# Patient Record
Sex: MALE | Race: WHITE | Employment: FULL TIME | URBAN - METROPOLITAN AREA
[De-identification: names, ages, dates, MRNs, and addresses within clinical notes are randomized per-mention and may not be internally consistent; named-entity substitution may affect disease eponyms.]

---

## 2021-03-08 ENCOUNTER — HOSPITAL ENCOUNTER (EMERGENCY)
Age: 46
Discharge: HOME | End: 2021-03-08
Attending: EMERGENCY MEDICINE
Payer: COMMERCIAL

## 2021-03-08 VITALS
SYSTOLIC BLOOD PRESSURE: 139 MMHG | HEIGHT: 71 IN | TEMPERATURE: 98.1 F | RESPIRATION RATE: 18 BRPM | OXYGEN SATURATION: 98 % | DIASTOLIC BLOOD PRESSURE: 88 MMHG | HEART RATE: 60 BPM | WEIGHT: 190 LBS | BODY MASS INDEX: 26.6 KG/M2

## 2021-03-08 DIAGNOSIS — S56.911A STRAIN OF RIGHT FOREARM, INITIAL ENCOUNTER: Primary | ICD-10-CM

## 2021-03-08 PROCEDURE — 96372 THER/PROPH/DIAG INJ SC/IM: CPT

## 2021-03-08 PROCEDURE — 6360000002 HC RX W HCPCS: Performed by: EMERGENCY MEDICINE

## 2021-03-08 PROCEDURE — 99283 EMERGENCY DEPT VISIT LOW MDM: CPT

## 2021-03-08 RX ORDER — KETOROLAC TROMETHAMINE 10 MG/1
10 TABLET, FILM COATED ORAL EVERY 6 HOURS PRN
Qty: 20 TABLET | Refills: 0 | Status: SHIPPED | OUTPATIENT
Start: 2021-03-08

## 2021-03-08 RX ORDER — KETOROLAC TROMETHAMINE 30 MG/ML
60 INJECTION, SOLUTION INTRAMUSCULAR; INTRAVENOUS ONCE
Status: COMPLETED | OUTPATIENT
Start: 2021-03-08 | End: 2021-03-08

## 2021-03-08 RX ORDER — ORPHENADRINE CITRATE 30 MG/ML
60 INJECTION INTRAMUSCULAR; INTRAVENOUS ONCE
Status: COMPLETED | OUTPATIENT
Start: 2021-03-08 | End: 2021-03-08

## 2021-03-08 RX ORDER — CYCLOBENZAPRINE HCL 10 MG
10 TABLET ORAL 3 TIMES DAILY PRN
Qty: 30 TABLET | Refills: 0 | Status: SHIPPED | OUTPATIENT
Start: 2021-03-08 | End: 2021-03-18

## 2021-03-08 RX ADMIN — ORPHENADRINE CITRATE 60 MG: 30 INJECTION INTRAMUSCULAR; INTRAVENOUS at 01:52

## 2021-03-08 RX ADMIN — KETOROLAC TROMETHAMINE 60 MG: 30 INJECTION, SOLUTION INTRAMUSCULAR at 01:41

## 2021-03-08 ASSESSMENT — PAIN DESCRIPTION - PROGRESSION: CLINICAL_PROGRESSION: GRADUALLY IMPROVING

## 2021-03-08 ASSESSMENT — ENCOUNTER SYMPTOMS
SINUS PRESSURE: 0
NAUSEA: 0
ABDOMINAL DISTENTION: 0
APNEA: 0
EYE PAIN: 0
PHOTOPHOBIA: 0
DIARRHEA: 0
VOMITING: 0
ABDOMINAL PAIN: 0
WHEEZING: 0
BACK PAIN: 0
COUGH: 0
COLOR CHANGE: 0
CONSTIPATION: 0
SHORTNESS OF BREATH: 0
SORE THROAT: 0
RHINORRHEA: 0

## 2021-03-08 ASSESSMENT — PAIN DESCRIPTION - DESCRIPTORS: DESCRIPTORS: SHARP

## 2021-03-08 ASSESSMENT — PAIN SCALES - GENERAL
PAINLEVEL_OUTOF10: 8
PAINLEVEL_OUTOF10: 6

## 2021-03-08 ASSESSMENT — PAIN DESCRIPTION - LOCATION
LOCATION: ARM
LOCATION: ARM

## 2021-03-08 ASSESSMENT — PAIN DESCRIPTION - FREQUENCY: FREQUENCY: CONTINUOUS

## 2021-03-08 ASSESSMENT — PAIN - FUNCTIONAL ASSESSMENT: PAIN_FUNCTIONAL_ASSESSMENT: 0-10

## 2021-03-08 ASSESSMENT — PAIN DESCRIPTION - ORIENTATION
ORIENTATION: RIGHT
ORIENTATION: LOWER

## 2021-03-08 ASSESSMENT — PAIN DESCRIPTION - ONSET: ONSET: ON-GOING

## 2021-03-08 NOTE — ED TRIAGE NOTES
Pt reporting was trying to push a 350lb hog to hold it and his Rt arm started hurting. He is having pain In the Rt forearm.

## 2021-03-08 NOTE — ED PROVIDER NOTES
[address] [place] [address] ED  eMERGENCY dEPARTMENT eNCOUnter      Pt Name: Jac Tate  MRN: [id number]  [place] 1975  Date of evaluation: 3/8/2021  Provider: Denzel Romero MD    CHIEF COMPLAINT       Chief Complaint   Patient presents with    Arm Injury     Injured boy hog while he was holding it          HISTORY OF PRESENT ILLNESS   (Location/Symptom, Timing/Onset,Context/Setting, Quality, Duration, Modifying Factors, Severity)  Note limiting factors. Jac Tate is a 50 y.o. male who presents to the emergency department with complaint of right forearm burn wound holding onto hogs at home earlier in the afternoon. Pain is 6 on a scale 1-10 and worse with use and movement. Denies any other systemic symptoms. Denies any other injuries. Pain does not radiate. Pain was immediate. HPI    Nursing Notes were reviewed. REVIEW OF SYSTEMS    (2-9 systems for level 4, 10 or more for level 5)     Review of Systems   Constitutional: Negative. Negative for activity change, appetite change, chills, fatigue and fever. HENT: Negative for congestion, ear discharge, ear pain, hearing loss, rhinorrhea, sinus pressure and sore throat. Eyes: Negative for photophobia, pain and visual disturbance. Respiratory: Negative for apnea, cough, shortness of breath and wheezing. Cardiovascular: Negative for chest pain, palpitations and leg swelling. Gastrointestinal: Negative for abdominal distention, abdominal pain, constipation, diarrhea, nausea and vomiting. Endocrine: Negative for cold intolerance, heat intolerance and polyuria. Genitourinary: Negative for dysuria, flank pain, frequency and urgency. Musculoskeletal: Positive for arthralgias and myalgias. Negative for back pain, gait problem and neck stiffness. Skin: Negative for color change, pallor and rash. Allergic/Immunologic: Negative for food allergies and immunocompromised state.    Neurological: Negative for dizziness, tremors, syncope, weakness, light-headedness and headaches. Psychiatric/Behavioral: Negative for agitation, confusion and hallucinations. All other systems reviewed and are negative. Except as noted above the remainder of the review of systems was reviewed and negative. PAST MEDICAL HISTORY     Past Medical History:   Diagnosis Date    Angio-edema 10/28/2011    autoimmune. dx'd by Dr Juan Hilton Hypertension     Raynaud phenomenon     Probably primary         SURGICAL HISTORY     History reviewed. No pertinent surgical history. CURRENT MEDICATIONS       Previous Medications    NIFEDIPINE (PROCARDIA-XL) 30 MG (OSM) CR TABLET    Take 1 tablet by mouth daily. ALLERGIES     Pcn [penicillins]    FAMILY HISTORY     History reviewed. No pertinent family history.        SOCIAL HISTORY       Social History     Socioeconomic History    Marital status: Single     Spouse name: None    Number of children: None    Years of education: None    Highest education level: None   Occupational History    None   Social Needs    Financial resource strain: None    Food insecurity     Worry: None     Inability: None    Transportation needs     Medical: None     Non-medical: None   Tobacco Use    Smoking status: Never Smoker    Smokeless tobacco: Current User     Types: Snuff    Tobacco comment: one can week   Substance and Sexual Activity    Alcohol use: Yes     Comment: seldom    Drug use: No    Sexual activity: Yes     Partners: Female   Lifestyle    Physical activity     Days per week: None     Minutes per session: None    Stress: None   Relationships    Social connections     Talks on phone: None     Gets together: None     Attends Jainism service: None     Active member of club or organization: None     Attends meetings of clubs or organizations: None     Relationship status: None    Intimate partner violence     Fear of current or ex partner: None     Emotionally abused: None     Physically abused: None Forced sexual activity: None   Other Topics Concern    None   Social History Narrative    None       SCREENINGS             PHYSICAL EXAM    (up to 7 for level 4, 8 or more for level 5)     ED Triage Vitals [03/08/21 0127]   BP Temp Temp Source Pulse Resp SpO2 Height Weight   (!) 145/87 98 °F (36.7 °C) Oral 56 16 96 % 5' 11\" (1.803 m) 190 lb (86.2 kg)       Physical Exam  Vitals signs and nursing note reviewed. Constitutional:       General: He is not in acute distress. Appearance: Normal appearance. He is well-developed and normal weight. He is not ill-appearing, toxic-appearing or diaphoretic. HENT:      Head: Normocephalic and atraumatic. Nose: Nose normal. No congestion or rhinorrhea. Mouth/Throat:      Mouth: Mucous membranes are moist.      Pharynx: No oropharyngeal exudate or posterior oropharyngeal erythema. Eyes:      General: No scleral icterus. Right eye: No discharge. Left eye: No discharge. Conjunctiva/sclera: Conjunctivae normal.      Pupils: Pupils are equal, round, and reactive to light. Neck:      Musculoskeletal: Normal range of motion and neck supple. No neck rigidity or muscular tenderness. Thyroid: No thyromegaly. Vascular: No carotid bruit or JVD. Trachea: No tracheal deviation. Cardiovascular:      Rate and Rhythm: Normal rate and regular rhythm. Heart sounds: Normal heart sounds. No murmur. No friction rub. No gallop. Pulmonary:      Effort: Pulmonary effort is normal. No respiratory distress. Breath sounds: Normal breath sounds. No stridor. No wheezing, rhonchi or rales. Chest:      Chest wall: No tenderness. Abdominal:      General: Bowel sounds are normal. There is no distension. Palpations: Abdomen is soft. There is no mass. Tenderness: There is no abdominal tenderness. There is no right CVA tenderness, left CVA tenderness, guarding or rebound. Hernia: No hernia is present.    Musculoskeletal: Normal range of motion. General: Swelling, tenderness and signs of injury present. No deformity. Right lower leg: No edema. Left lower leg: No edema. Comments: Right forearm swelling and tenderness. Lymphadenopathy:      Cervical: No cervical adenopathy. Skin:     General: Skin is warm and dry. Capillary Refill: Capillary refill takes less than 2 seconds. Coloration: Skin is not jaundiced or pale. Findings: No bruising, erythema, lesion or rash. Neurological:      General: No focal deficit present. Mental Status: He is alert and oriented to person, place, and time. Mental status is at baseline. Cranial Nerves: No cranial nerve deficit. Sensory: No sensory deficit. Motor: No weakness or abnormal muscle tone. Coordination: Coordination normal.      Gait: Gait normal.      Deep Tendon Reflexes: Reflexes are normal and symmetric. Reflexes normal.   Psychiatric:         Mood and Affect: Mood normal.         Behavior: Behavior normal.         Thought Content: Thought content normal.         Judgment: Judgment normal.         DIAGNOSTIC RESULTS     EKG: All EKG's are interpreted by the Emergency Department Physician who either signs or Co-signs this chart in the absence of a cardiologist.        RADIOLOGY:   Non-plain film images such as CT, Ultrasound and MRI are read by the radiologist. Shonda Mireles radiographicimages are visualized and preliminarily interpreted by the emergency physician with the below findings:        Interpretation per the Radiologist below, if available at the time of this note:    No orders to display         ED BEDSIDE ULTRASOUND:   Performed by ED Physician - none    LABS:  Labs Reviewed - No data to display    All other labs were within normal range or not returned as of this dictation.     EMERGENCY DEPARTMENT COURSE and DIFFERENTIALDIAGNOSIS/MDM:   Vitals:    Vitals:    03/08/21 0127   BP: (!) 145/87   Pulse: 56   Resp: 16   Temp: 98 °F (36.7 °C)   TempSrc: Oral   SpO2: 96%   Weight: 190 lb (86.2 kg)   Height: 5' 11\" (1.803 m)           MDM  Number of Diagnoses or Management Options  Risk of Complications, Morbidity, and/or Mortality  Presenting problems: moderate  Diagnostic procedures: minimal  Management options: moderate        CRITICAL CARE TIME   Total Critical Care time was  minutes, excluding separately reportable procedures. There was a high probability of clinically significant/life threatening deterioration in the patient's condition which required my urgentintervention. CONSULTS:  None    PROCEDURES:  Unless otherwise noted below, none     Procedures    FINAL IMPRESSION      1.  Strain of right forearm, initial encounter          DISPOSITION/PLAN   DISPOSITION Discharge - Pending Orders Complete 03/08/2021 01:41:20 AM      PATIENT REFERRED TO:  Minda Gaucher, MD  [place] [phone]    In 3 days        DISCHARGE MEDICATIONS:  New Prescriptions    CYCLOBENZAPRINE (FLEXERIL) 10 MG TABLET    Take 1 tablet by mouth 3 times daily as needed for Muscle spasms    KETOROLAC (TORADOL) 10 MG TABLET    Take 1 tablet by mouth every 6 hours as needed for Pain          (Please note that portions of this note were completed with a voice recognitionprogram.  Efforts were made to edit the dictations but occasionally words are mis-transcribed.)    Viky Ramires MD (electronically signed)  Attending Emergency Physician          Viky Ramires MD  03/08/21 [id number]       Viky Ramires MD  03/08/21 [id number]

## 2023-11-29 ENCOUNTER — OFFICE VISIT (OUTPATIENT)
Dept: PRIMARY CARE | Facility: CLINIC | Age: 48
End: 2023-11-29
Payer: COMMERCIAL

## 2023-11-29 VITALS
TEMPERATURE: 98 F | BODY MASS INDEX: 28.35 KG/M2 | HEIGHT: 70 IN | SYSTOLIC BLOOD PRESSURE: 123 MMHG | HEART RATE: 61 BPM | DIASTOLIC BLOOD PRESSURE: 80 MMHG | WEIGHT: 198 LBS

## 2023-11-29 DIAGNOSIS — Z13.220 LIPID SCREENING: ICD-10-CM

## 2023-11-29 DIAGNOSIS — Z12.11 COLON CANCER SCREENING: ICD-10-CM

## 2023-11-29 DIAGNOSIS — Z12.5 PROSTATE CANCER SCREENING: ICD-10-CM

## 2023-11-29 DIAGNOSIS — I10 PRIMARY HYPERTENSION: Primary | ICD-10-CM

## 2023-11-29 PROBLEM — M54.50 ACUTE LOW BACK PAIN WITHOUT SCIATICA: Status: ACTIVE | Noted: 2023-11-29

## 2023-11-29 PROCEDURE — 3079F DIAST BP 80-89 MM HG: CPT | Performed by: INTERNAL MEDICINE

## 2023-11-29 PROCEDURE — 3074F SYST BP LT 130 MM HG: CPT | Performed by: INTERNAL MEDICINE

## 2023-11-29 PROCEDURE — 1036F TOBACCO NON-USER: CPT | Performed by: INTERNAL MEDICINE

## 2023-11-29 PROCEDURE — 99213 OFFICE O/P EST LOW 20 MIN: CPT | Performed by: INTERNAL MEDICINE

## 2023-11-29 RX ORDER — NIFEDIPINE 60 MG/1
60 TABLET, EXTENDED RELEASE ORAL DAILY
COMMUNITY
End: 2023-11-29 | Stop reason: SDUPTHER

## 2023-11-29 RX ORDER — NIFEDIPINE 60 MG/1
60 TABLET, EXTENDED RELEASE ORAL DAILY
Qty: 90 TABLET | Refills: 3 | Status: SHIPPED | OUTPATIENT
Start: 2023-11-29 | End: 2024-03-13 | Stop reason: SDUPTHER

## 2023-11-29 ASSESSMENT — ENCOUNTER SYMPTOMS
MUSCULOSKELETAL NEGATIVE: 1
HEADACHES: 0
RESPIRATORY NEGATIVE: 1
CONSTITUTIONAL NEGATIVE: 1
CARDIOVASCULAR NEGATIVE: 1
HYPERTENSION: 1
BLURRED VISION: 0
NEUROLOGICAL NEGATIVE: 1
GASTROINTESTINAL NEGATIVE: 1
PALPITATIONS: 0
PSYCHIATRIC NEGATIVE: 1
EYES NEGATIVE: 1
ORTHOPNEA: 0
SHORTNESS OF BREATH: 0

## 2023-11-29 NOTE — PROGRESS NOTES
"Subjective   Patient ID: Beny Roth II is a 47 y.o. male who presents for [facility] ([facility] care former Dr. Kirk pt).    Hypertension  This is a chronic problem. The current episode started more than 1 year ago. The problem has been waxing and waning since onset. The problem is controlled. Pertinent negatives include no anxiety, blurred vision, chest pain, headaches, malaise/fatigue, orthopnea, palpitations or shortness of breath. Past treatments include calcium channel blockers. The current treatment provides significant improvement. There is no history of angina or kidney disease. There is no history of chronic renal disease.        Review of Systems   Constitutional: Negative.  Negative for malaise/fatigue.   HENT: Negative.     Eyes: Negative.  Negative for blurred vision.   Respiratory: Negative.  Negative for shortness of breath.    Cardiovascular: Negative.  Negative for chest pain, palpitations and orthopnea.   Gastrointestinal: Negative.    Musculoskeletal: Negative.    Skin: Negative.    Neurological: Negative.  Negative for headaches.   Psychiatric/Behavioral: Negative.         Objective   /80 (BP Location: Right arm, Patient Position: Sitting, BP Cuff Size: Adult)   Pulse 61   Temp 36.7 °C (98 °F) (Temporal)   Ht 1.772 m (5' 9.75\")   Wt 89.8 kg (198 lb)   BMI 28.61 kg/m²     Physical Exam  Constitutional:       Appearance: Normal appearance. He is normal weight.   HENT:      Head: Normocephalic.   Eyes:      Conjunctiva/sclera: Conjunctivae normal.   Cardiovascular:      Rate and Rhythm: Normal rate and regular rhythm.      Pulses: Normal pulses.      Heart sounds: Normal heart sounds.   Pulmonary:      Effort: Pulmonary effort is normal.      Breath sounds: Normal breath sounds.   Abdominal:      General: Abdomen is flat.      Palpations: Abdomen is soft.   Musculoskeletal:         General: Normal range of motion.      Cervical back: Neck supple.   Skin:     General: " Skin is warm and dry.   Neurological:      General: No focal deficit present.      Mental Status: He is oriented to person, place, and time. Mental status is at baseline.   Psychiatric:         Mood and Affect: Mood normal.         Judgment: Judgment normal.       Assessment/Plan   Problem List Items Addressed This Visit             ICD-10-CM    Hypertension - Primary I10   Patients BP readings reviewed and addressed, as we age our arteries turn stiffer and less elastic. Restricting salt consumption and staying physically fit with regular exercise regimen is the only way to keep our vasculature less tonic. Studies have shown that keeping ideal body wt, exercise routine about 140 to 150 minutes a week, eating variety of plant based diet and drinking plentiful water are quite helpful. Monitor BP twice or once a week at home and bring log to be reviewed by me. Uncontrolled BP has long term consequences including heart failure, myocardial infarction, accelerated atherosclerosis and kidney dysfunction. Therapy reviewed and explained.   47-year-old male patient came here as a new patient, he works full-time, he is , someone found that he was having Raynaud's-like symptoms somewhat prescribed nifedipine but he was hypertensive so dosage was increased to 60 mg and actually he is normotensive, previous to physicians evaluations were reviewed he has not have any laboratories done for more than 2 years.  He will require colonoscopy at the age of 47, he will require PSA screening and several other laboratories as a part of checkup, he remains largely asymptomatic, he is a little bit heavyset but I will make patient, he remains quite physically active and does not have any family issues or any mental health issues, will do the laboratories and nifedipine will be continued as listed without any change in the dosage with follow-up in 6 months.

## 2023-12-14 ENCOUNTER — TELEPHONE (OUTPATIENT)
Dept: GASTROENTEROLOGY | Facility: CLINIC | Age: 48
End: 2023-12-14
Payer: COMMERCIAL

## 2024-03-13 DIAGNOSIS — I10 PRIMARY HYPERTENSION: ICD-10-CM

## 2024-03-13 RX ORDER — NIFEDIPINE 60 MG/1
60 TABLET, EXTENDED RELEASE ORAL DAILY
Qty: 90 TABLET | Refills: 1 | Status: SHIPPED | OUTPATIENT
Start: 2024-03-13

## 2024-05-30 ENCOUNTER — OFFICE VISIT (OUTPATIENT)
Dept: PRIMARY CARE | Facility: CLINIC | Age: 49
End: 2024-05-30
Payer: COMMERCIAL

## 2024-05-30 ENCOUNTER — LAB (OUTPATIENT)
Dept: LAB | Facility: LAB | Age: 49
End: 2024-05-30
Payer: COMMERCIAL

## 2024-05-30 VITALS
WEIGHT: 201 LBS | SYSTOLIC BLOOD PRESSURE: 120 MMHG | BODY MASS INDEX: 28.77 KG/M2 | HEART RATE: 60 BPM | DIASTOLIC BLOOD PRESSURE: 80 MMHG | TEMPERATURE: 96.4 F | HEIGHT: 70 IN

## 2024-05-30 DIAGNOSIS — I10 PRIMARY HYPERTENSION: Primary | ICD-10-CM

## 2024-05-30 DIAGNOSIS — Z12.5 PROSTATE CANCER SCREENING: ICD-10-CM

## 2024-05-30 DIAGNOSIS — Z13.220 LIPID SCREENING: ICD-10-CM

## 2024-05-30 DIAGNOSIS — I10 PRIMARY HYPERTENSION: ICD-10-CM

## 2024-05-30 DIAGNOSIS — Z12.11 COLON CANCER SCREENING: ICD-10-CM

## 2024-05-30 LAB
ALBUMIN SERPL BCP-MCNC: 4.3 G/DL (ref 3.4–5)
ALP SERPL-CCNC: 71 U/L (ref 33–120)
ALT SERPL W P-5'-P-CCNC: 23 U/L (ref 10–52)
ANION GAP SERPL CALC-SCNC: 10 MMOL/L (ref 10–20)
AST SERPL W P-5'-P-CCNC: 16 U/L (ref 9–39)
BASOPHILS # BLD AUTO: 0.07 X10*3/UL (ref 0–0.1)
BASOPHILS NFR BLD AUTO: 0.9 %
BILIRUB SERPL-MCNC: 0.6 MG/DL (ref 0–1.2)
BUN SERPL-MCNC: 18 MG/DL (ref 6–23)
CALCIUM SERPL-MCNC: 9.3 MG/DL (ref 8.6–10.3)
CHLORIDE SERPL-SCNC: 104 MMOL/L (ref 98–107)
CHOLEST SERPL-MCNC: 184 MG/DL (ref 0–199)
CHOLESTEROL/HDL RATIO: 3.4
CO2 SERPL-SCNC: 30 MMOL/L (ref 21–32)
CREAT SERPL-MCNC: 0.92 MG/DL (ref 0.5–1.3)
EGFRCR SERPLBLD CKD-EPI 2021: >90 ML/MIN/1.73M*2
EOSINOPHIL # BLD AUTO: 0.62 X10*3/UL (ref 0–0.7)
EOSINOPHIL NFR BLD AUTO: 8.1 %
ERYTHROCYTE [DISTWIDTH] IN BLOOD BY AUTOMATED COUNT: 13.2 % (ref 11.5–14.5)
GLUCOSE SERPL-MCNC: 70 MG/DL (ref 74–99)
HCT VFR BLD AUTO: 44.5 % (ref 41–52)
HDLC SERPL-MCNC: 54.7 MG/DL
HGB BLD-MCNC: 15.1 G/DL (ref 13.5–17.5)
IMM GRANULOCYTES # BLD AUTO: 0.03 X10*3/UL (ref 0–0.7)
IMM GRANULOCYTES NFR BLD AUTO: 0.4 % (ref 0–0.9)
LDLC SERPL CALC-MCNC: 94 MG/DL
LYMPHOCYTES # BLD AUTO: 1.29 X10*3/UL (ref 1.2–4.8)
LYMPHOCYTES NFR BLD AUTO: 16.8 %
MCH RBC QN AUTO: 29.7 PG (ref 26–34)
MCHC RBC AUTO-ENTMCNC: 33.9 G/DL (ref 32–36)
MCV RBC AUTO: 87 FL (ref 80–100)
MONOCYTES # BLD AUTO: 0.71 X10*3/UL (ref 0.1–1)
MONOCYTES NFR BLD AUTO: 9.2 %
NEUTROPHILS # BLD AUTO: 4.96 X10*3/UL (ref 1.2–7.7)
NEUTROPHILS NFR BLD AUTO: 64.6 %
NON HDL CHOLESTEROL: 129 MG/DL (ref 0–149)
NRBC BLD-RTO: 0 /100 WBCS (ref 0–0)
PLATELET # BLD AUTO: 185 X10*3/UL (ref 150–450)
POTASSIUM SERPL-SCNC: 4.1 MMOL/L (ref 3.5–5.3)
PROT SERPL-MCNC: 6.5 G/DL (ref 6.4–8.2)
PSA SERPL-MCNC: 1.62 NG/ML
RBC # BLD AUTO: 5.09 X10*6/UL (ref 4.5–5.9)
SODIUM SERPL-SCNC: 140 MMOL/L (ref 136–145)
TRIGL SERPL-MCNC: 178 MG/DL (ref 0–149)
VLDL: 36 MG/DL (ref 0–40)
WBC # BLD AUTO: 7.7 X10*3/UL (ref 4.4–11.3)

## 2024-05-30 PROCEDURE — 3074F SYST BP LT 130 MM HG: CPT | Performed by: INTERNAL MEDICINE

## 2024-05-30 PROCEDURE — 80053 COMPREHEN METABOLIC PANEL: CPT

## 2024-05-30 PROCEDURE — 1036F TOBACCO NON-USER: CPT | Performed by: INTERNAL MEDICINE

## 2024-05-30 PROCEDURE — 99213 OFFICE O/P EST LOW 20 MIN: CPT | Performed by: INTERNAL MEDICINE

## 2024-05-30 PROCEDURE — 85025 COMPLETE CBC W/AUTO DIFF WBC: CPT

## 2024-05-30 PROCEDURE — 80061 LIPID PANEL: CPT

## 2024-05-30 PROCEDURE — 3079F DIAST BP 80-89 MM HG: CPT | Performed by: INTERNAL MEDICINE

## 2024-05-30 PROCEDURE — 84153 ASSAY OF PSA TOTAL: CPT

## 2024-05-30 PROCEDURE — 36415 COLL VENOUS BLD VENIPUNCTURE: CPT

## 2024-05-30 ASSESSMENT — ENCOUNTER SYMPTOMS
MUSCULOSKELETAL NEGATIVE: 1
CARDIOVASCULAR NEGATIVE: 1
CONSTITUTIONAL NEGATIVE: 1
SHORTNESS OF BREATH: 0
DIZZINESS: 0
HEADACHES: 0
RESPIRATORY NEGATIVE: 1
GASTROINTESTINAL NEGATIVE: 1
HYPERTENSION: 1
PALPITATIONS: 0

## 2024-05-30 NOTE — PROGRESS NOTES
"Subjective   Patient ID: Beny Roth II is a 48 y.o. male who presents for Follow-up (6 mo fu).    Hypertension  This is a chronic problem. The current episode started more than 1 year ago. The problem has been resolved since onset. The problem is controlled. Pertinent negatives include no anxiety, chest pain, headaches, palpitations, peripheral edema or shortness of breath. Risk factors for coronary artery disease include male gender. Past treatments include calcium channel blockers. The current treatment provides significant improvement. There is no history of angina or kidney disease. There is no history of chronic renal disease.        Review of Systems   Constitutional: Negative.    Respiratory: Negative.  Negative for shortness of breath.    Cardiovascular: Negative.  Negative for chest pain and palpitations.   Gastrointestinal: Negative.    Musculoskeletal: Negative.    Neurological:  Negative for dizziness and headaches.       Objective   /80 (BP Location: Right arm, Patient Position: Sitting, BP Cuff Size: Adult)   Pulse 60   Temp 35.8 °C (96.4 °F) (Temporal)   Ht 1.772 m (5' 9.75\")   Wt 91.2 kg (201 lb)   BMI 29.05 kg/m²     Physical Exam  Vitals reviewed.   Constitutional:       Appearance: Normal appearance. He is overweight.   HENT:      Head: Normocephalic and atraumatic.   Eyes:      Conjunctiva/sclera: Conjunctivae normal.   Cardiovascular:      Rate and Rhythm: Normal rate and regular rhythm.      Pulses: Normal pulses.   Pulmonary:      Effort: Pulmonary effort is normal.      Breath sounds: Normal breath sounds.   Abdominal:      Palpations: Abdomen is soft.   Musculoskeletal:         General: Normal range of motion.      Cervical back: Neck supple.   Skin:     General: Skin is warm and dry.   Neurological:      General: No focal deficit present.         Assessment/Plan   Problem List Items Addressed This Visit             ICD-10-CM    Hypertension - Primary I10   Patient was " evaluated today, problem list was reviewed, problems and concerns addressed, Rx list reviewed and updated, lab and tests were noted and reviewed. Life style changes were discussed, always it works better if we eat plant based diet and plenty of fibres and roughage. Consume adequate amount of water and avoid alcohol, light to moderate physical activities and stress reduction are always beneficial for ongoing physical well being. Do not forget to have 6 to 7 hours of sleep regularly.  He does need his labs today it will be followed, he remains on nifedipine, he works at the [employer].  No events or concerns, he sleeps well, considering the age of 48 we will require colonoscopy which was actually offered last time but somehow it was not done, another referral was given, he will require coronary calcium score also considering his risk profile.  Lets wait for lipid profile, no new medications were given, follow-up in 6 months.

## 2024-06-04 ENCOUNTER — TELEPHONE (OUTPATIENT)
Dept: GASTROENTEROLOGY | Facility: CLINIC | Age: 49
End: 2024-06-04
Payer: COMMERCIAL

## 2024-08-28 RX ORDER — SODIUM CHLORIDE, SODIUM LACTATE, POTASSIUM CHLORIDE, CALCIUM CHLORIDE 600; 310; 30; 20 MG/100ML; MG/100ML; MG/100ML; MG/100ML
20 INJECTION, SOLUTION INTRAVENOUS CONTINUOUS
OUTPATIENT
Start: 2024-08-28

## 2024-09-06 ENCOUNTER — APPOINTMENT (OUTPATIENT)
Dept: GASTROENTEROLOGY | Facility: HOSPITAL | Age: 49
End: 2024-09-06
Payer: COMMERCIAL

## 2024-09-23 ENCOUNTER — TELEPHONE (OUTPATIENT)
Dept: SURGERY | Facility: CLINIC | Age: 49
End: 2024-09-23
Payer: COMMERCIAL

## 2024-09-23 NOTE — PREPROCEDURE INSTRUCTIONS

## 2024-09-30 ENCOUNTER — HOSPITAL ENCOUNTER (OUTPATIENT)
Dept: GASTROENTEROLOGY | Facility: HOSPITAL | Age: 49
Discharge: HOME | End: 2024-09-30
Payer: COMMERCIAL

## 2024-09-30 ENCOUNTER — ANESTHESIA EVENT (OUTPATIENT)
Dept: GASTROENTEROLOGY | Facility: HOSPITAL | Age: 49
End: 2024-09-30
Payer: COMMERCIAL

## 2024-09-30 ENCOUNTER — ANESTHESIA (OUTPATIENT)
Dept: GASTROENTEROLOGY | Facility: HOSPITAL | Age: 49
End: 2024-09-30
Payer: COMMERCIAL

## 2024-09-30 VITALS
TEMPERATURE: 97.2 F | HEIGHT: 70 IN | OXYGEN SATURATION: 98 % | DIASTOLIC BLOOD PRESSURE: 74 MMHG | WEIGHT: 186.51 LBS | BODY MASS INDEX: 26.7 KG/M2 | HEART RATE: 67 BPM | RESPIRATION RATE: 18 BRPM | SYSTOLIC BLOOD PRESSURE: 118 MMHG

## 2024-09-30 DIAGNOSIS — Z12.11 COLON CANCER SCREENING: ICD-10-CM

## 2024-09-30 PROCEDURE — 2500000004 HC RX 250 GENERAL PHARMACY W/ HCPCS (ALT 636 FOR OP/ED)

## 2024-09-30 PROCEDURE — 3700000001 HC GENERAL ANESTHESIA TIME - INITIAL BASE CHARGE

## 2024-09-30 PROCEDURE — 7100000010 HC PHASE TWO TIME - EACH INCREMENTAL 1 MINUTE

## 2024-09-30 PROCEDURE — 7100000009 HC PHASE TWO TIME - INITIAL BASE CHARGE

## 2024-09-30 PROCEDURE — 2500000005 HC RX 250 GENERAL PHARMACY W/O HCPCS

## 2024-09-30 PROCEDURE — 3700000002 HC GENERAL ANESTHESIA TIME - EACH INCREMENTAL 1 MINUTE

## 2024-09-30 PROCEDURE — 2500000004 HC RX 250 GENERAL PHARMACY W/ HCPCS (ALT 636 FOR OP/ED): Performed by: STUDENT IN AN ORGANIZED HEALTH CARE EDUCATION/TRAINING PROGRAM

## 2024-09-30 PROCEDURE — 45378 DIAGNOSTIC COLONOSCOPY: CPT | Performed by: SURGERY

## 2024-09-30 RX ORDER — GLYCOPYRROLATE 0.2 MG/ML
INJECTION INTRAMUSCULAR; INTRAVENOUS AS NEEDED
Status: DISCONTINUED | OUTPATIENT
Start: 2024-09-30 | End: 2024-09-30

## 2024-09-30 RX ORDER — LIDOCAINE HYDROCHLORIDE 20 MG/ML
INJECTION, SOLUTION INFILTRATION; PERINEURAL AS NEEDED
Status: DISCONTINUED | OUTPATIENT
Start: 2024-09-30 | End: 2024-09-30

## 2024-09-30 RX ORDER — PROPOFOL 10 MG/ML
INJECTION, EMULSION INTRAVENOUS AS NEEDED
Status: DISCONTINUED | OUTPATIENT
Start: 2024-09-30 | End: 2024-09-30

## 2024-09-30 RX ORDER — SODIUM CHLORIDE, SODIUM LACTATE, POTASSIUM CHLORIDE, CALCIUM CHLORIDE 600; 310; 30; 20 MG/100ML; MG/100ML; MG/100ML; MG/100ML
20 INJECTION, SOLUTION INTRAVENOUS CONTINUOUS
Status: DISCONTINUED | OUTPATIENT
Start: 2024-09-30 | End: 2024-10-01 | Stop reason: HOSPADM

## 2024-09-30 SDOH — HEALTH STABILITY: MENTAL HEALTH: CURRENT SMOKER: 0

## 2024-09-30 ASSESSMENT — ENCOUNTER SYMPTOMS
CONSTITUTIONAL NEGATIVE: 1
MUSCULOSKELETAL NEGATIVE: 1
PSYCHIATRIC NEGATIVE: 1
CARDIOVASCULAR NEGATIVE: 1
GASTROINTESTINAL NEGATIVE: 1
EYES NEGATIVE: 1
ENDOCRINE NEGATIVE: 1
HEMATOLOGIC/LYMPHATIC NEGATIVE: 1
ALLERGIC/IMMUNOLOGIC NEGATIVE: 1
RESPIRATORY NEGATIVE: 1
NEUROLOGICAL NEGATIVE: 1

## 2024-09-30 ASSESSMENT — COLUMBIA-SUICIDE SEVERITY RATING SCALE - C-SSRS
1. IN THE PAST MONTH, HAVE YOU WISHED YOU WERE DEAD OR WISHED YOU COULD GO TO SLEEP AND NOT WAKE UP?: NO
2. HAVE YOU ACTUALLY HAD ANY THOUGHTS OF KILLING YOURSELF?: NO
6. HAVE YOU EVER DONE ANYTHING, STARTED TO DO ANYTHING, OR PREPARED TO DO ANYTHING TO END YOUR LIFE?: NO

## 2024-09-30 ASSESSMENT — PAIN SCALES - GENERAL
PAINLEVEL_OUTOF10: 0 - NO PAIN

## 2024-09-30 ASSESSMENT — PAIN - FUNCTIONAL ASSESSMENT
PAIN_FUNCTIONAL_ASSESSMENT: 0-10

## 2024-09-30 NOTE — Clinical Note
Patient tolerated procedure well. Appears comfortable with no complaints of pain. VS stable. Arousable prior to transport. Patient transported to [facility] via cart.  Report called      per CRNA to [facility]        . Handoff completed

## 2024-09-30 NOTE — ANESTHESIA POSTPROCEDURE EVALUATION
Patient: Beny Roth II    Procedure Summary       Date: 09/30/24 Room / Location: [facility]    Anesthesia Start: 1038 Anesthesia Stop:     Procedure: COLONOSCOPY Diagnosis: Colon cancer screening    Scheduled Providers: Odalys Carter MD; Torrey Wolfe MD; Sanchez Breen RN; Jeanne Gray Responsible Provider: Torrey Wolfe MD    Anesthesia Type: MAC ASA Status: 2            Anesthesia Type: MAC    Vitals Value Taken Time   /77 09/30/24 1105   Temp - 09/30/24 1105   Pulse 65 09/30/24 1105   Resp 18 09/30/24 1105   SpO2 96 09/30/24 1105       Anesthesia Post Evaluation    Patient location during evaluation: PACU  Patient participation: complete - patient participated  Level of consciousness: awake  Pain management: adequate  Multimodal analgesia pain management approach  Airway patency: patent  Cardiovascular status: acceptable  Respiratory status: acceptable  Hydration status: acceptable  Postoperative Nausea and Vomiting: none        No notable events documented.

## 2024-09-30 NOTE — H&P
History Of Present Illness  Beny Roth II is a 48 y.o. male presenting with screening.     Past Medical History  Past Medical History:   Diagnosis Date    COVID-19     VACCINATED    Hypertension     Wears contact lenses     Wears glasses        Surgical History  No past surgical history on file.     Social History  He reports that he has never smoked. He has quit using smokeless tobacco.  His smokeless tobacco use included chew. He reports that he does not currently use alcohol. He reports that he does not use drugs.    Family History  Family History   Problem Relation Name Age of Onset    Hypertension Father      Heart disease Maternal Grandmother      Other (abdominal aortic aneurysm) Paternal Grandfather          Allergies  Penicillins    Review of Systems   Constitutional: Negative.    HENT: Negative.     Eyes: Negative.    Respiratory: Negative.     Cardiovascular: Negative.    Gastrointestinal: Negative.    Endocrine: Negative.    Genitourinary: Negative.    Musculoskeletal: Negative.    Skin: Negative.    Allergic/Immunologic: Negative.    Neurological: Negative.    Hematological: Negative.    Psychiatric/Behavioral: Negative.          Physical Exam  Constitutional:       General: He is not in acute distress.     Appearance: Normal appearance. He is normal weight. He is not toxic-appearing or diaphoretic.   HENT:      Head: Normocephalic and atraumatic.   Eyes:      Conjunctiva/sclera: Conjunctivae normal.      Pupils: Pupils are equal, round, and reactive to light.   Cardiovascular:      Rate and Rhythm: Normal rate and regular rhythm.   Pulmonary:      Effort: Pulmonary effort is normal. No respiratory distress.   Abdominal:      General: Abdomen is flat. There is no distension.      Palpations: Abdomen is soft. There is no mass.      Tenderness: There is no abdominal tenderness.      Hernia: No hernia is present.   Musculoskeletal:         General: No swelling.   Skin:     General: Skin is warm and  dry.   Neurological:      General: No focal deficit present.      Mental Status: He is alert. Mental status is at baseline.   Psychiatric:         Mood and Affect: Mood normal.         Behavior: Behavior normal.         Thought Content: Thought content normal.         Judgment: Judgment normal.          Last Recorded Vitals  There were no vitals taken for this visit.    Relevant Results             Assessment/Plan   Assessment & Plan  Colon cancer screening      colonoscopy       I spent  minutes in the professional and overall care of this patient.      Odalys Carter MD

## 2024-09-30 NOTE — Clinical Note
Huddle and Timeout completed together with team. Patient wristband and CELIA information verified.  Anesthesia safety check completed

## 2024-09-30 NOTE — DISCHARGE INSTRUCTIONS
Patient Instructions after a Colonoscopy      The anesthetics, sedatives or narcotics which were given to you today will be acting in your body for the next 24 hours, so you might feel a little sleepy or groggy.  This feeling should slowly wear off. Carefully read and follow the instructions.     You received sedation today:  - Do not drive or operate any machinery or power tools of any kind.   - No alcoholic beverages today, not even beer or wine.  - Do not make any important decisions or sign any legal documents.  - No over the counter medications that contain alcohol or that may cause drowsiness.  - Do not make any important decisions or sign any legal documents.    While it is common to experience mild to moderate abdominal distention, gas, or belching after your procedure, if any of these symptoms occur following discharge from the GI Lab or within one week of having your procedure, call the [facility] to be advised whether a visit to your nearest Urgent Care or Emergency Department is indicated.  Take this paper with you if you go.     - If you develop an allergic reaction to the medications that were given during your procedure such as difficulty breathing, rash, hives, severe nausea, vomiting or lightheadedness.  - If you experience chest pain, shortness of breath, severe abdominal pain, fevers and chills.  -If you develop signs and symptoms of bleeding such as blood in your spit, if your stools turn black, tarry, or bloody  - If you have not urinated within 8 hours following your procedure.  - If your IV site becomes painful, red, inflamed, or looks infected.    If you received a biopsy/polypectomy/sphincterotomy the following instructions apply below:    __ Do not use Aspirin containing products, non-steroidal medications or anti-coagulants for one week following your procedure. (Examples of these types of medications are: Advil, Arthrotec, Aleve, Coumadin, Ecotrin, Heparin, Ibuprofen,  Indocin, Motrin, Naprosyn, Nuprin, Plavix, Vioxx, and Voltarin, or their generic forms.  This list is not all-inclusive.  Check with your physician or pharmacist before resuming medications.)   __ Eat a soft diet today.  Avoid foods that are poorly digested for the next 24 hours.  These foods would include: nuts, beans, lettuce, red meats, and fried foods. Start with liquids and advance your diet as tolerated, gradually work up to eating solids.   __ Do not have a Barium Study or Enema for one week.    Your physician recommends the additional following instructions:    -You have a contact number available for emergencies. The signs and symptoms of potential delayed complications were discussed with you. You may return to normal activities tomorrow.  -Resume your previous diet.  -Continue your present medications.   -We are waiting for your pathology results.  -Your physician has recommended a repeat colonoscopy (date to be determined after pending pathology results are reviewed) for surveillance based on pathology results.  -The findings and recommendations have been discussed with you.  -The findings and recommendations were discussed with your family.  - Please see Medication Reconciliation Form for new medication/medications prescribed.       If you experience any problems or have any questions following discharge from the GI Lab, please call:  Before 5p.m.  [phone]  After 5p.m.    [phone]    Nurse Signature                                                                        Date___________________                                                                            Patient/Responsible Party Signature                                        Date___________________High Fiber Diet    About this topic  Dietary fiber helps many illnesses. It can help you if you cannot have a bowel movement or if you have loose stools. Fiber can also lower your risk of diabetes and heart disease. Fiber can help  with weight loss by helping you feel smith after meals.  You can find fiber in fruits, vegetables, nuts and seeds, whole grains, and legumes. The fiber is the part of the plant food that your body cannot break down and absorb. It passes through your stomach, small bowel, colon, and out your body.  There are two kinds of fiber: Insoluble and soluble fiber. Insoluble fiber helps you pass foods through your digestive system. Insoluble fiber can help you with hard stools. Soluble fiber draws water in and turns it into a gel-like form making digestion slow down. Both are important.    What will the results be?  A high fiber diet can help you with bowel problems like stools that are too hard or too loose. It can also help prevent hemorrhoids and other colon problems. A high fiber diet can also help control your weight and lower blood sugar and cholesterol levels.  What changes to diet are needed?  The amount of fiber you need is based on your age, gender, and health.  Try to get 20 to 35 grams of fiber in your diet each day. Most people in the [place] only eat 15 grams of fiber daily.  Drink at least 8 cups (1920 mL) of fluid each day.  When is this diet used?  Your doctor may talk with you about this diet if you have belly problems.  Who should use this diet?  Older children, young people, and adults can have this diet.  Who should not use this diet?  Some people should not use this diet. Check with your doctor if you have:  Diverticulitis  Active Crohn's disease  Ulcerative colitis  Bowel inflammation  Certain types of GI surgery  Talk to your child's doctor before starting your child on a high fiber diet.  What foods are good to eat?  To get the most from fiber in your diet, eat a wide variety of high fiber foods. Some examples are:  Vegetables like:  Spinach  Peas  Artichoke  Sweet potatoes with skin  Broccoli  Fruits like:  Raspberries  Blueberries  Blackberries  Apples with skin  Dried fruits  Grains like:  Oat  bran  Barley  Whole wheat products  Wheat bran  Dried beans and nuts like:  Sunflower seeds  Almonds  Black beans  Chickpeas  What problems could happen?  Sudden increase of fiber intake can lead to gas, pain, fullness in your belly, and loose stools. Increase fiber gradually while drinking plenty of fluids.  Do not eat too much fiber. Your body will not take in vitamins and minerals as well if you eat too much fiber.  When do I need to call the doctor?  Health problem is not better or you are feeling worse.  Helpful tips  Start slow as you add more fiber to your diet. This may help prevent gas or cramps.  Try to eat the same amount of fiber each day. Aim to get your fiber from nutritious foods. Supplements do not offer the same benefits as food.  Read food labels with care to learn how much fiber is in the food you are eating.  If possible, do not peel fruits or vegetables before you eat them. Eating the peel gives you more fiber.  Where can I learn more?  Eat Right  https://www.eatright.org/food/vitamins-and-supplements/types-of-vitamins-and-nutrients/easy-ways-to-boost-fiber-in-your-daily-diet  Last Reviewed Date  2021-09-23

## 2024-09-30 NOTE — ANESTHESIA PREPROCEDURE EVALUATION
Beny Roth II is a 48 y.o. male here for:    Colonoscopy  With Odalys Carter MD  Colon cancer screening    Relevant Problems   Cardiac   (+) Hypertension       Lab Results   Component Value Date    HGB 15.1 05/30/2024    HCT 44.5 05/30/2024    WBC 7.7 05/30/2024     05/30/2024     05/30/2024    K 4.1 05/30/2024     05/30/2024    CREATININE 0.92 05/30/2024    BUN 18 05/30/2024       Social History     Tobacco Use   Smoking Status Never   Smokeless Tobacco Former    Types: Chew       Allergies   Allergen Reactions    Penicillins Rash       Current Outpatient Medications   Medication Instructions    NIFEdipine ER (ADALAT CC) 60 mg, oral, Daily       No past surgical history on file.    Family History   Problem Relation Name Age of Onset    Hypertension Father      Heart disease Maternal Grandmother      Other (abdominal aortic aneurysm) Paternal Grandfather         NPO Details:  No data recorded    Physical Exam    Airway  Mallampati: II  TM distance: >3 FB     Cardiovascular    Dental    Pulmonary    Abdominal            Anesthesia Plan    History of general anesthesia?: no  History of complications of general anesthesia?: no    ASA 2     MAC     The patient is not a current smoker.    intravenous induction   Anesthetic plan and risks discussed with patient.    Plan discussed with CRNA.

## 2024-10-02 NOTE — PROGRESS NOTES
"Beny Roth II is a 48 y.o. male on day 0 of admission presenting with No Principal Problem: There is no principal problem currently on the Problem List. Please update the Problem List and refresh..    Subjective   I did not do colonoscopy on this patient so I cannot comment on polyp removal or how many polyps were removed I am not the gastroenterologist.       Objective     Physical Exam    Last Recorded Vitals  Blood pressure 118/74, pulse 67, temperature 36.2 °C (97.2 °F), temperature source Temporal, resp. rate 18, height 1.778 m (5' 10\"), weight 84.6 kg (186 lb 8.2 oz), SpO2 98%.  Intake/Output last 3 Shifts:  No intake/output data recorded.    Relevant Results                              Assessment/Plan   Assessment & Plan  Colon cancer screening    I did not do colonoscopy.       I spent I am responding to the query.  Minutes in the professional and overall care of this patient.      Justo Patricia MD      "

## 2024-10-05 LAB
LABORATORY COMMENT REPORT: NORMAL
PATH REPORT.FINAL DX SPEC: NORMAL
PATH REPORT.GROSS SPEC: NORMAL
PATH REPORT.RELEVANT HX SPEC: NORMAL
PATH REPORT.TOTAL CANCER: NORMAL

## 2024-10-22 DIAGNOSIS — I10 PRIMARY HYPERTENSION: ICD-10-CM

## 2024-10-22 RX ORDER — NIFEDIPINE 60 MG/1
60 TABLET, EXTENDED RELEASE ORAL DAILY
Qty: 90 TABLET | Refills: 1 | Status: SHIPPED | OUTPATIENT
Start: 2024-10-22

## 2024-10-30 ENCOUNTER — HOSPITAL ENCOUNTER (OUTPATIENT)
Dept: RADIOLOGY | Facility: HOSPITAL | Age: 49
Discharge: HOME | End: 2024-10-30
Payer: COMMERCIAL

## 2024-10-30 DIAGNOSIS — I10 PRIMARY HYPERTENSION: ICD-10-CM

## 2024-10-30 PROCEDURE — 75571 CT HRT W/O DYE W/CA TEST: CPT

## 2024-11-05 ENCOUNTER — OFFICE VISIT (OUTPATIENT)
Dept: URGENT CARE | Age: 49
End: 2024-11-05
Payer: COMMERCIAL

## 2024-11-05 VITALS
BODY MASS INDEX: 27.72 KG/M2 | HEIGHT: 71 IN | HEART RATE: 57 BPM | OXYGEN SATURATION: 92 % | TEMPERATURE: 97.7 F | DIASTOLIC BLOOD PRESSURE: 76 MMHG | SYSTOLIC BLOOD PRESSURE: 130 MMHG | WEIGHT: 198 LBS | RESPIRATION RATE: 18 BRPM

## 2024-11-05 DIAGNOSIS — S81.811A LEG LACERATION, RIGHT, INITIAL ENCOUNTER: Primary | ICD-10-CM

## 2024-11-05 PROCEDURE — 3078F DIAST BP <80 MM HG: CPT | Performed by: PHYSICIAN ASSISTANT

## 2024-11-05 PROCEDURE — 99203 OFFICE O/P NEW LOW 30 MIN: CPT | Performed by: PHYSICIAN ASSISTANT

## 2024-11-05 PROCEDURE — 3075F SYST BP GE 130 - 139MM HG: CPT | Performed by: PHYSICIAN ASSISTANT

## 2024-11-05 PROCEDURE — 3008F BODY MASS INDEX DOCD: CPT | Performed by: PHYSICIAN ASSISTANT

## 2024-11-05 PROCEDURE — 12002 RPR S/N/AX/GEN/TRNK2.6-7.5CM: CPT | Performed by: PHYSICIAN ASSISTANT

## 2024-11-05 PROCEDURE — 1036F TOBACCO NON-USER: CPT | Performed by: PHYSICIAN ASSISTANT

## 2024-11-05 NOTE — PATIENT INSTRUCTIONS
Keep wound covered for 24 hours and remove dressing.  Afterwards, wash daily with soap and water but do not submerge for 1 week.  Keep clean, dry, and open to air unless engaging in activity that might compromise the repair.  Do not apply topical ointments and do not use harsh soaps for cleansing.  Suture removal in 7 to 10 days.

## 2024-11-05 NOTE — PROGRESS NOTES
"Subjective   Patient ID: Beny Roth II is a 48 y.o. male who presents for Laceration (Cut on inside of medial rt knee in patella. Injury happened on 11/5/24.).    Laceration    Presents for evaluation and treatment of   laceration.  The pt accidently lacerated the anterior right thigh just above the patella just pta.  Pt was able to apply pressure and affect hemostasis.  No other lacerations as a result.  Pt does not take anti-platelet/coagulant medicine.  No change in ROM or gross sensation.  Pt is current with tetanus prophylaxis.  No other complaints        Review of Systems    Constitutional:  See HPI   Integumentary: See HPI  Neurologic:  Alert and oriented X4, No numbness, No tingling.    All other systems are negative     Objective     /76   Pulse 57   Temp 36.5 °C (97.7 °F) (Oral)   Resp 18   Ht 1.803 m (5' 11\")   Wt 89.8 kg (198 lb)   SpO2 92%   BMI 27.62 kg/m²     Physical Exam    General:  Alert and oriented, No acute distress.    Eye:  Pupils are equal, round and reactive to light, Normal conjunctiva.    HENT:  Normocephalic,   Neck:  Supple    Respiratory: Respirations are non-labored   Musculoskeletal: Normal ROM and strength  Integumentary: Anterior right thigh with a 5 cm full-thickness laceration; deep tissue was exposed but not involved; wound is clean and without debris  Neurologic:  Alert, Oriented, Normal sensory, Cranial Nerves II-XII are grossly intact  Psychiatric:  Cooperative, Appropriate mood & affect.    Laceration Repair    Date/Time: 11/5/2024 3:16 PM    Performed by: Kin Rosenthal PA-C  Authorized by: Kin Rosenthal PA-C    Consent:     Consent obtained:  Verbal    Consent given by:  Patient    Risks discussed:  Infection  Anesthesia:     Anesthesia method:  Local infiltration    Local anesthetic:  Lidocaine 2% WITH epi  Laceration details:     Location:  Leg    Leg location:  R upper leg    Length (cm):  5  Pre-procedure details:     Preparation:  Patient was " prepped and draped in usual sterile fashion  Treatment:     Area cleansed with:  Chlorhexidine    Amount of cleaning:  Standard  Skin repair:     Repair method:  Sutures    Suture size:  3-0    Suture material:  Nylon    Number of sutures:  5  Repair type:     Repair type:  Simple  Post-procedure details:     Dressing:  Antibiotic ointment and sterile dressing    Procedure completion:  Tolerated well, no immediate complications        Assessment/Plan   Exam revealed right leg laceration.  Good results with procedure.  Wound care reviewed.  Suture removal in 7 to 10 days.  Patient's clinical presentation is otherwise unremarkable at this time. Patient is discharged with instructions to follow-up with primary care or seek emergency medical attention for worsening symptoms or any new concerns.  Problem List Items Addressed This Visit    None  Visit Diagnoses       Leg laceration, right, initial encounter    -  Primary            Final diagnoses:   [L52.674W] Leg laceration, right, initial encounter

## 2024-11-05 NOTE — LETTER
November 5, 2024     Patient: Beny Roth II   YOB: 1975   Date of Visit: 11/5/2024       To Whom It May Concern:    Beny Roth was seen in my clinic on 11/5/2024 at 2:15 pm. Please excuse Beny for his absence from work on this day to make the appointment.    If you have any questions or concerns, please don't hesitate to call.         Sincerely,         Kin Rosenthal PA-C        CC: No Recipients

## 2024-11-26 ENCOUNTER — APPOINTMENT (OUTPATIENT)
Dept: PRIMARY CARE | Facility: CLINIC | Age: 49
End: 2024-11-26
Payer: COMMERCIAL

## 2025-02-12 ENCOUNTER — APPOINTMENT (OUTPATIENT)
Dept: PRIMARY CARE | Facility: CLINIC | Age: 50
End: 2025-02-12
Payer: COMMERCIAL

## 2025-02-12 VITALS
WEIGHT: 197 LBS | HEIGHT: 70 IN | SYSTOLIC BLOOD PRESSURE: 134 MMHG | DIASTOLIC BLOOD PRESSURE: 78 MMHG | BODY MASS INDEX: 28.2 KG/M2 | HEART RATE: 54 BPM | TEMPERATURE: 96.9 F

## 2025-02-12 DIAGNOSIS — Z12.5 PROSTATE CANCER SCREENING: Primary | ICD-10-CM

## 2025-02-12 DIAGNOSIS — R00.1 BRADYCARDIA: ICD-10-CM

## 2025-02-12 DIAGNOSIS — I10 PRIMARY HYPERTENSION: ICD-10-CM

## 2025-02-12 DIAGNOSIS — Z13.220 LIPID SCREENING: ICD-10-CM

## 2025-02-12 PROCEDURE — 1036F TOBACCO NON-USER: CPT | Performed by: INTERNAL MEDICINE

## 2025-02-12 PROCEDURE — 93000 ELECTROCARDIOGRAM COMPLETE: CPT | Performed by: INTERNAL MEDICINE

## 2025-02-12 PROCEDURE — 99213 OFFICE O/P EST LOW 20 MIN: CPT | Performed by: INTERNAL MEDICINE

## 2025-02-12 PROCEDURE — 3078F DIAST BP <80 MM HG: CPT | Performed by: INTERNAL MEDICINE

## 2025-02-12 PROCEDURE — 3075F SYST BP GE 130 - 139MM HG: CPT | Performed by: INTERNAL MEDICINE

## 2025-02-12 PROCEDURE — 3008F BODY MASS INDEX DOCD: CPT | Performed by: INTERNAL MEDICINE

## 2025-02-12 RX ORDER — NIFEDIPINE 60 MG/1
60 TABLET, EXTENDED RELEASE ORAL DAILY
Qty: 90 TABLET | Refills: 1 | Status: SHIPPED | OUTPATIENT
Start: 2025-02-12

## 2025-02-12 ASSESSMENT — PATIENT HEALTH QUESTIONNAIRE - PHQ9
SUM OF ALL RESPONSES TO PHQ9 QUESTIONS 1 AND 2: 0
2. FEELING DOWN, DEPRESSED OR HOPELESS: NOT AT ALL
1. LITTLE INTEREST OR PLEASURE IN DOING THINGS: NOT AT ALL

## 2025-02-12 ASSESSMENT — ENCOUNTER SYMPTOMS
NEUROLOGICAL NEGATIVE: 1
RESPIRATORY NEGATIVE: 1
LOSS OF SENSATION IN FEET: 0
CONSTITUTIONAL NEGATIVE: 1
CARDIOVASCULAR NEGATIVE: 1
DEPRESSION: 0
MUSCULOSKELETAL NEGATIVE: 1
OCCASIONAL FEELINGS OF UNSTEADINESS: 0
GASTROINTESTINAL NEGATIVE: 1

## 2025-02-12 ASSESSMENT — COLUMBIA-SUICIDE SEVERITY RATING SCALE - C-SSRS
1. IN THE PAST MONTH, HAVE YOU WISHED YOU WERE DEAD OR WISHED YOU COULD GO TO SLEEP AND NOT WAKE UP?: NO
6. HAVE YOU EVER DONE ANYTHING, STARTED TO DO ANYTHING, OR PREPARED TO DO ANYTHING TO END YOUR LIFE?: NO
2. HAVE YOU ACTUALLY HAD ANY THOUGHTS OF KILLING YOURSELF?: NO

## 2025-02-12 NOTE — PROGRESS NOTES
Progress Note    Patient: Lesly Zhang MRN: [id number]  SSN: [id number]    YOB: 1943  Age: 66 y.o. Sex: male      Admit Date: 4/5/2018    LOS: 6 days     Subjective:     More obtunded today did have a additional cardiopulmonary arrest last night. Underlying etiology of cognitive status remains unclear. Unable to get MRI yesterday in terms of his overall stability. Objective: There is a mild withdrawal response to deep painful stimulation  Vitals:    04/11/18 0738 04/11/18 0756 04/11/18 0822 04/11/18 0952   BP:       Pulse: 89 77 77 81   Resp: 18 22 22 21   Temp:       SpO2: 99% 100% 100% 99%   Weight:       Height:            Intake and Output:  Current Shift: 04/11 0701 - 04/11 1900  In: -   Out: 44   Last three shifts: 04/09 1901 - 04/11 0700  In: 4533.2 [I.V.:2669.2]  Out: 4956 [Urine:4800]    Physical Exam:   Unresponsive. With propping his eyes open there are some random eye movements. Corneals are present bilaterally pupils are equal and reactive to light. Lab/Data Review:  Recent Results (from the past 24 hour(s))   AMMONIA    Collection Time: 04/10/18 12:45 PM   Result Value Ref Range    Ammonia <10 (L) 11 - 32 UMOL/L   BLOOD GAS, ARTERIAL    Collection Time: 04/11/18  3:50 AM   Result Value Ref Range    pH 7.14 (L) 7.35 - 7.45      PCO2 56 (H) 35 - 45 mmHg    PO2 176 (H) 80 - 105 mmHg    BICARBONATE 19 (L) 22 - 26 mmol/L    BASE DEFICIT 10.7 (H) 0 - 2 mmol/L    SITE RB     ALLENS TEST NA     MODE PRVC     FIO2 100.0 %    Tidal volume 400.0      RATE 15.0      PEEP/CPAP 5.0      Respiratory comment: Dr Carter Axon at 4 11 2018 4 10 02 AM. Read back.     METABOLIC PANEL, BASIC    Collection Time: 04/11/18  4:49 AM   Result Value Ref Range    Sodium 147 (H) 136 - 145 mmol/L    Potassium 4.3 3.5 - 5.1 mmol/L    Chloride 109 (H) 98 - 107 mmol/L    CO2 26 21 - 32 mmol/L    Anion gap 12 7 - 16 mmol/L    Glucose 307 (H) 65 - 100 mg/dL    BUN 77 (H) 8 - 23 MG/DL    Creatinine Subjective   Patient ID: Beny Roth II is a 49 y.o. male who presents for Follow-up (6 mo fu).  HPI  Hypertension  This is a chronic problem. The current episode started more than 1 year ago. The problem has been resolved since onset. The problem is controlled. Pertinent negatives include no anxiety, chest pain, headaches, palpitations, peripheral edema or shortness of breath. Risk factors for coronary artery disease include male gender. Past treatments include calcium channel blockers. The current treatment provides significant improvement. There is no history of angina or kidney disease. There is no history of chronic renal disease.     Past Medical History  Past Medical History:   Diagnosis Date    Hypertension        Social History  Social History     Tobacco Use    Smoking status: Never    Smokeless tobacco: Former     Types: Chew   Vaping Use    Vaping status: Never Used   Substance Use Topics    Alcohol use: Not Currently     Comment: RARELY    Drug use: Never       Family History     Family History   Problem Relation Name Age of Onset    Hypertension Father      Heart disease Maternal Grandmother      Other (abdominal aortic aneurysm) Paternal Grandfather         Allergies:  Allergies   Allergen Reactions    Penicillins Rash        Outpatient Medications:  Current Outpatient Medications   Medication Instructions    NIFEdipine ER (ADALAT CC) 60 mg, oral, Daily        Review of Systems   Constitutional: Negative.    HENT: Negative.     Respiratory: Negative.     Cardiovascular: Negative.    Gastrointestinal: Negative.    Musculoskeletal: Negative.    Neurological: Negative.          Objective       Physical Exam  Vitals reviewed.   Constitutional:       Appearance: Normal appearance. He is normal weight.   HENT:      Head: Normocephalic and atraumatic.   Eyes:      Conjunctiva/sclera: Conjunctivae normal.   Cardiovascular:      Rate and Rhythm: Regular rhythm. Bradycardia present.      Pulses: Normal  "pulses.   Pulmonary:      Effort: Pulmonary effort is normal.      Breath sounds: Normal breath sounds.   Abdominal:      Palpations: Abdomen is soft.   Musculoskeletal:         General: Normal range of motion.      Cervical back: Neck supple.   Skin:     General: Skin is warm and dry.   Neurological:      General: No focal deficit present.   Psychiatric:         Mood and Affect: Mood normal.     /78 (BP Location: Right arm, Patient Position: Sitting, BP Cuff Size: Adult)   Pulse 54   Temp 36.1 °C (96.9 °F) (Temporal)   Ht 1.765 m (5' 9.5\")   Wt 89.4 kg (197 lb)   BMI 28.67 kg/m²      Assessment/Plan   Problem List Items Addressed This Visit       Hypertension    Relevant Medications    NIFEdipine XL 60 mg 24 hr tablet   He came for regular checkup, he feels well, he is on nifedipine, during exam I noticed that he is bradycardic, heart rate was 56 but when I did EKG it was 47, I do not see any previous EKG, he is not aware about being told that his heart rate is low, on my previous encounter I did not have a low heart beats, we need a 7-day heart monitor, we need to see the training that what is happening on a daily basis with his heart rate, nifedipine is appropriate treatment, at least he is not on any beta-blockers.  Last times laboratories were unremarkable, we will add a TSH on the next times labs, colonoscopy was reviewed, coronary calcium score scan was reviewed.  Screen for thyroid disorder, depends upon the Holter monitor appropriate interventions will be taken, he will continue to do his normal activities, follow-up in 6 months.  " 2.34 (H) 0.8 - 1.5 MG/DL    GFR est AA 35 (L) >60 ml/min/1.73m2    GFR est non-AA 29 (L) >60 ml/min/1.73m2    Calcium 8.0 (L) 8.3 - 10.4 MG/DL   CBC W/O DIFF    Collection Time: 04/11/18  4:49 AM   Result Value Ref Range    WBC 13.2 (H) 4.3 - 11.1 K/uL    RBC 2.69 (L) 4.23 - 5.67 M/uL    HGB 8.5 (L) 13.6 - 17.2 g/dL    HCT 26.4 (L) 41.1 - 50.3 %    MCV 98.1 (H) 79.6 - 97.8 FL    MCH 31.6 26.1 - 32.9 PG    MCHC 32.2 31.4 - 35.0 g/dL    RDW 14.0 11.9 - 14.6 %    PLATELET 443 (L) 052 - 450 K/uL    MPV 11.5 10.8 - 14.1 FL   MAGNESIUM    Collection Time: 04/11/18  4:49 AM   Result Value Ref Range    Magnesium 2.2 1.8 - 2.4 mg/dL   PHOSPHORUS    Collection Time: 04/11/18  4:49 AM   Result Value Ref Range    Phosphorus 6.7 (H) 2.3 - 3.7 MG/DL   TROPONIN I    Collection Time: 04/11/18  4:49 AM   Result Value Ref Range    Troponin-I, Qt. 3.40 (HH) 0.02 - 0.05 NG/ML   BLOOD GAS, ARTERIAL    Collection Time: 04/11/18  6:30 AM   Result Value Ref Range    pH 7.29 (L) 7.35 - 7.45      PCO2 52 (H) 35 - 45 mmHg    PO2 64 (L) 80 - 105 mmHg    BICARBONATE 24 22 - 26 mmol/L    BASE DEFICIT 3.2 (H) 0 - 2 mmol/L    SITE RB     ALLENS TEST NA     MODE PRVC     FIO2 80.0 %    Tidal volume 500.0      PEEP/CPAP 5.0      Respiratory comment: Marjorie HAYWARD at 4 11 2018 6 47 00 AM. Read back. 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 Principal Problem:    Pneumonia of right lower lobe due to infectious organism ([facility] (4/5/2018)    Active Problems:    Acute respiratory failure with hypoxia ([facility] (4/6/2018)      Encephalopathy (4/6/2018)      Pleural effusion (4/6/2018)      Cardiac arrest ([address] (4/6/2018)      Bacteremia (4/7/2018)      Volume overload (4/9/2018)      Pneumothorax on left (4/11/2018)      Hypotension (4/11/2018)        Plan:     I would continue with support. No evidence of seizure in terms of underlying etiology  Possibility of associated meningeal encephalitis with pneumonitis. Unlikely.   The patient is not immunocompromised which is the most common scenario although the undersigned has recently seen a case associated with high positive influenza A titers    In terms of brainstem reflexes there has been no specific deterioration since yesterday despite the arrest    Signed By: Radu Monteiro MD     April 11, 2018

## 2025-02-13 ENCOUNTER — TELEPHONE (OUTPATIENT)
Dept: CARDIOLOGY | Facility: CLINIC | Age: 50
End: 2025-02-13
Payer: COMMERCIAL

## 2025-02-13 NOTE — TELEPHONE ENCOUNTER
7 day [employer] [id number] does not require prior auth per the [id number].

## 2025-03-03 ENCOUNTER — APPOINTMENT (OUTPATIENT)
Dept: CARDIOLOGY | Facility: CLINIC | Age: 50
End: 2025-03-03
Payer: COMMERCIAL

## 2025-03-03 DIAGNOSIS — R00.1 BRADYCARDIA: ICD-10-CM

## 2025-03-13 PROCEDURE — 93248 EXT ECG>7D<15D REV&INTERPJ: CPT | Performed by: INTERNAL MEDICINE

## 2025-04-03 ENCOUNTER — HOSPITAL ENCOUNTER (EMERGENCY)
Facility: HOSPITAL | Age: 50
Discharge: HOME | End: 2025-04-04
Attending: STUDENT IN AN ORGANIZED HEALTH CARE EDUCATION/TRAINING PROGRAM
Payer: COMMERCIAL

## 2025-04-03 ENCOUNTER — APPOINTMENT (OUTPATIENT)
Dept: RADIOLOGY | Facility: HOSPITAL | Age: 50
End: 2025-04-03
Payer: COMMERCIAL

## 2025-04-03 DIAGNOSIS — S70.11XA HEMATOMA OF RIGHT THIGH, INITIAL ENCOUNTER: Primary | ICD-10-CM

## 2025-04-03 LAB
ABO GROUP (TYPE) IN BLOOD: NORMAL
ANTIBODY SCREEN: NORMAL
APTT PPP: 29 SECONDS (ref 26–36)
BASOPHILS # BLD AUTO: 0.04 X10*3/UL (ref 0–0.1)
BASOPHILS NFR BLD AUTO: 0.3 %
EOSINOPHIL # BLD AUTO: 0.15 X10*3/UL (ref 0–0.7)
EOSINOPHIL NFR BLD AUTO: 1.1 %
ERYTHROCYTE [DISTWIDTH] IN BLOOD BY AUTOMATED COUNT: 12.7 % (ref 11.5–14.5)
HCT VFR BLD AUTO: 40.6 % (ref 41–52)
HGB BLD-MCNC: 14.1 G/DL (ref 13.5–17.5)
IMM GRANULOCYTES # BLD AUTO: 0.07 X10*3/UL (ref 0–0.7)
IMM GRANULOCYTES NFR BLD AUTO: 0.5 % (ref 0–0.9)
INR PPP: 1.1 (ref 0.9–1.1)
LYMPHOCYTES # BLD AUTO: 0.8 X10*3/UL (ref 1.2–4.8)
LYMPHOCYTES NFR BLD AUTO: 5.8 %
MCH RBC QN AUTO: 29.5 PG (ref 26–34)
MCHC RBC AUTO-ENTMCNC: 34.7 G/DL (ref 32–36)
MCV RBC AUTO: 85 FL (ref 80–100)
MONOCYTES # BLD AUTO: 0.9 X10*3/UL (ref 0.1–1)
MONOCYTES NFR BLD AUTO: 6.5 %
NEUTROPHILS # BLD AUTO: 11.94 X10*3/UL (ref 1.2–7.7)
NEUTROPHILS NFR BLD AUTO: 85.8 %
NRBC BLD-RTO: 0 /100 WBCS (ref 0–0)
PLATELET # BLD AUTO: 194 X10*3/UL (ref 150–450)
PROTHROMBIN TIME: 12 SECONDS (ref 9.8–12.4)
RBC # BLD AUTO: 4.78 X10*6/UL (ref 4.5–5.9)
RH FACTOR (ANTIGEN D): NORMAL
WBC # BLD AUTO: 13.9 X10*3/UL (ref 4.4–11.3)

## 2025-04-03 PROCEDURE — 73552 X-RAY EXAM OF FEMUR 2/>: CPT | Mod: RT

## 2025-04-03 PROCEDURE — 85025 COMPLETE CBC W/AUTO DIFF WBC: CPT | Performed by: PHYSICIAN ASSISTANT

## 2025-04-03 PROCEDURE — 80053 COMPREHEN METABOLIC PANEL: CPT | Performed by: PHYSICIAN ASSISTANT

## 2025-04-03 PROCEDURE — 2500000001 HC RX 250 WO HCPCS SELF ADMINISTERED DRUGS (ALT 637 FOR MEDICARE OP): Performed by: STUDENT IN AN ORGANIZED HEALTH CARE EDUCATION/TRAINING PROGRAM

## 2025-04-03 PROCEDURE — 85610 PROTHROMBIN TIME: CPT | Performed by: STUDENT IN AN ORGANIZED HEALTH CARE EDUCATION/TRAINING PROGRAM

## 2025-04-03 PROCEDURE — 86900 BLOOD TYPING SEROLOGIC ABO: CPT | Performed by: STUDENT IN AN ORGANIZED HEALTH CARE EDUCATION/TRAINING PROGRAM

## 2025-04-03 PROCEDURE — 86850 RBC ANTIBODY SCREEN: CPT | Performed by: STUDENT IN AN ORGANIZED HEALTH CARE EDUCATION/TRAINING PROGRAM

## 2025-04-03 PROCEDURE — 85730 THROMBOPLASTIN TIME PARTIAL: CPT | Performed by: STUDENT IN AN ORGANIZED HEALTH CARE EDUCATION/TRAINING PROGRAM

## 2025-04-03 PROCEDURE — 36415 COLL VENOUS BLD VENIPUNCTURE: CPT | Performed by: STUDENT IN AN ORGANIZED HEALTH CARE EDUCATION/TRAINING PROGRAM

## 2025-04-03 PROCEDURE — 73552 X-RAY EXAM OF FEMUR 2/>: CPT | Mod: RIGHT SIDE | Performed by: SURGERY

## 2025-04-03 PROCEDURE — 99285 EMERGENCY DEPT VISIT HI MDM: CPT | Performed by: STUDENT IN AN ORGANIZED HEALTH CARE EDUCATION/TRAINING PROGRAM

## 2025-04-03 RX ORDER — MORPHINE SULFATE 4 MG/ML
4 INJECTION, SOLUTION INTRAMUSCULAR; INTRAVENOUS ONCE
Status: DISCONTINUED | OUTPATIENT
Start: 2025-04-03 | End: 2025-04-03

## 2025-04-03 RX ORDER — ACETAMINOPHEN 325 MG/1
650 TABLET ORAL ONCE
Status: COMPLETED | OUTPATIENT
Start: 2025-04-03 | End: 2025-04-03

## 2025-04-03 RX ORDER — ONDANSETRON HYDROCHLORIDE 2 MG/ML
4 INJECTION, SOLUTION INTRAVENOUS ONCE
Status: DISCONTINUED | OUTPATIENT
Start: 2025-04-03 | End: 2025-04-04 | Stop reason: HOSPADM

## 2025-04-03 RX ORDER — IBUPROFEN 400 MG/1
400 TABLET ORAL ONCE
Status: COMPLETED | OUTPATIENT
Start: 2025-04-03 | End: 2025-04-03

## 2025-04-03 RX ADMIN — ACETAMINOPHEN 650 MG: 325 TABLET ORAL at 23:01

## 2025-04-03 RX ADMIN — IBUPROFEN 400 MG: 400 TABLET, FILM COATED ORAL at 23:01

## 2025-04-03 ASSESSMENT — LIFESTYLE VARIABLES
HAVE PEOPLE ANNOYED YOU BY CRITICIZING YOUR DRINKING: NO
EVER FELT BAD OR GUILTY ABOUT YOUR DRINKING: NO
EVER HAD A DRINK FIRST THING IN THE MORNING TO STEADY YOUR NERVES TO GET RID OF A HANGOVER: NO
TOTAL SCORE: 0
HAVE YOU EVER FELT YOU SHOULD CUT DOWN ON YOUR DRINKING: NO

## 2025-04-03 ASSESSMENT — PAIN DESCRIPTION - ORIENTATION: ORIENTATION: RIGHT

## 2025-04-03 ASSESSMENT — PAIN - FUNCTIONAL ASSESSMENT: PAIN_FUNCTIONAL_ASSESSMENT: 0-10

## 2025-04-03 ASSESSMENT — PAIN DESCRIPTION - LOCATION: LOCATION: LEG

## 2025-04-03 ASSESSMENT — PAIN SCALES - GENERAL: PAINLEVEL_OUTOF10: 8

## 2025-04-03 NOTE — Clinical Note
Beny Roth was seen and treated in our emergency department on 4/3/2025.  He may return to work on 04/07/2025.       If you have any questions or concerns, please don't hesitate to call.      Kirby Rush PA-C

## 2025-04-04 ENCOUNTER — APPOINTMENT (OUTPATIENT)
Dept: RADIOLOGY | Facility: HOSPITAL | Age: 50
End: 2025-04-04
Payer: COMMERCIAL

## 2025-04-04 VITALS
WEIGHT: 197 LBS | HEART RATE: 50 BPM | RESPIRATION RATE: 16 BRPM | HEIGHT: 70 IN | OXYGEN SATURATION: 98 % | TEMPERATURE: 99 F | BODY MASS INDEX: 28.2 KG/M2 | SYSTOLIC BLOOD PRESSURE: 138 MMHG | DIASTOLIC BLOOD PRESSURE: 74 MMHG

## 2025-04-04 LAB
ALBUMIN SERPL BCP-MCNC: 4.2 G/DL (ref 3.4–5)
ALP SERPL-CCNC: 77 U/L (ref 33–120)
ALT SERPL W P-5'-P-CCNC: 23 U/L (ref 10–52)
ANION GAP SERPL CALC-SCNC: 11 MMOL/L (ref 10–20)
AST SERPL W P-5'-P-CCNC: 18 U/L (ref 9–39)
BILIRUB SERPL-MCNC: 0.9 MG/DL (ref 0–1.2)
BUN SERPL-MCNC: 19 MG/DL (ref 6–23)
CALCIUM SERPL-MCNC: 8.9 MG/DL (ref 8.6–10.3)
CHLORIDE SERPL-SCNC: 104 MMOL/L (ref 98–107)
CO2 SERPL-SCNC: 27 MMOL/L (ref 21–32)
CREAT SERPL-MCNC: 0.86 MG/DL (ref 0.5–1.3)
EGFRCR SERPLBLD CKD-EPI 2021: >90 ML/MIN/1.73M*2
GLUCOSE SERPL-MCNC: 100 MG/DL (ref 74–99)
POTASSIUM SERPL-SCNC: 3.9 MMOL/L (ref 3.5–5.3)
PROT SERPL-MCNC: 6.6 G/DL (ref 6.4–8.2)
SODIUM SERPL-SCNC: 138 MMOL/L (ref 136–145)

## 2025-04-04 PROCEDURE — 2550000001 HC RX 255 CONTRASTS: Performed by: STUDENT IN AN ORGANIZED HEALTH CARE EDUCATION/TRAINING PROGRAM

## 2025-04-04 PROCEDURE — 73706 CT ANGIO LWR EXTR W/O&W/DYE: CPT | Mod: RT

## 2025-04-04 PROCEDURE — 73706 CT ANGIO LWR EXTR W/O&W/DYE: CPT | Mod: RIGHT SIDE | Performed by: SURGERY

## 2025-04-04 RX ORDER — TRAMADOL HYDROCHLORIDE 50 MG/1
50 TABLET ORAL EVERY 6 HOURS PRN
Qty: 15 TABLET | Refills: 0 | Status: SHIPPED | OUTPATIENT
Start: 2025-04-04 | End: 2025-04-08

## 2025-04-04 RX ORDER — KETOROLAC TROMETHAMINE 10 MG/1
10 TABLET, FILM COATED ORAL EVERY 6 HOURS PRN
Qty: 20 TABLET | Refills: 0 | Status: SHIPPED | OUTPATIENT
Start: 2025-04-04 | End: 2025-04-09

## 2025-04-04 RX ADMIN — IOHEXOL 100 ML: 350 INJECTION, SOLUTION INTRAVENOUS at 00:58

## 2025-04-04 ASSESSMENT — PAIN SCALES - GENERAL: PAINLEVEL_OUTOF10: 6

## 2025-04-04 NOTE — PROGRESS NOTES
Emergency Medicine Transition of Care Note    I received Beny Roth II in signout from Parul Merritt.  Please see the previous ED provider note for all HPI, PE and MDM up to the time of signout at 0 100. This is in addition to the primary record.    In brief Beny Roth II is an 49 y.o. male presenting for   Chief Complaint   Patient presents with    Leg Pain     At the time of signout we were awaiting: CT study, reevaluation disposition    Diagnoses as of 04/04/25 0223   Hematoma of right thigh, initial encounter       Medical Decision Making  Patient's laboratory studies within normal limits other than a mild white count 13.9.  CT angio shows hematoma but no active extravasation.  Patient also has a small radiodense foreign body.  In discussing with this with the patient patient's foreign body is from a prior injury.  There is no active bleeding.  Will discharge patient home.  Will have patient's thigh wrapped with Ace apply ice.  We discussed reasons to return to the emergency department.  Patient agrees with this plan expressed verbal understanding.  Questions were answered.    Started the patient    Diagnosis: Right thigh hematoma        Final diagnoses:   [S70.11XA] Hematoma of right thigh, initial encounter     Labs Reviewed   CBC WITH AUTO DIFFERENTIAL - Abnormal       Result Value    WBC 13.9 (*)     nRBC 0.0      RBC 4.78      Hemoglobin 14.1      Hematocrit 40.6 (*)     MCV 85      MCH 29.5      MCHC 34.7      RDW 12.7      Platelets 194      Neutrophils % 85.8      Immature Granulocytes %, Automated 0.5      Lymphocytes % 5.8      Monocytes % 6.5      Eosinophils % 1.1      Basophils % 0.3      Neutrophils Absolute 11.94 (*)     Immature Granulocytes Absolute, Automated 0.07      Lymphocytes Absolute 0.80 (*)     Monocytes Absolute 0.90      Eosinophils Absolute 0.15      Basophils Absolute 0.04     COMPREHENSIVE METABOLIC PANEL - Abnormal    Glucose 100 (*)     Sodium 138      Potassium  3.9      Chloride 104      Bicarbonate 27      Anion Gap 11      Urea Nitrogen 19      Creatinine 0.86      eGFR >90      Calcium 8.9      Albumin 4.2      Alkaline Phosphatase 77      Total Protein 6.6      AST 18      Bilirubin, Total 0.9      ALT 23     PROTIME-INR - Normal    Protime 12.0      INR 1.1     APTT - Normal    aPTT 29      Narrative:     The APTT is no longer used for monitoring Unfractionated Heparin Therapy. For monitoring Heparin Therapy, use the Heparin Assay.   TYPE AND SCREEN    ABO TYPE O      Rh TYPE POS      ANTIBODY SCREEN NEG          CT angio lower extremity right w and or wo IV contrast   Final Result   There is an ill-defined region of the mixed attenuation compatible   with hematoma in the ventral right thigh musculature, the precise   margins of which are difficult to delineate, but which measures up to   at least 11.5 x 6.5 cm in transaxial diameters and 12.5 cm in CC   diameter. There is surrounding edema.        There is a small radiodense foreign body in the subcutaneous fat of   the ventral-medial right thigh, without surrounding edema, most   likely related to remote prior penetrating trauma.        Right lower extremity arteries appear normal. There is no abnormal   enhancement to suggest active bleeding.             MACRO:   None        Signed by: Arun Saunders 4/4/2025 1:32 AM   Dictation workstation:   [id number]      XR femur right 2+ views   Final Result   Please see above.             MACRO:   None        Signed by: Arun Saunders 4/3/2025 10:50 PM   Dictation workstation:   [id number]              Procedure  Procedures    Kirby Rush PA-C

## 2025-04-04 NOTE — ED PROVIDER NOTES
HPI   Chief Complaint   Patient presents with    Leg Pain       49-year-old male presents to the emergency department for right upper leg injury.  Patient states this morning, he was walking pushing a wheelbarrow and tripped and fell forward.  He states he struck his right thigh on something but is unsure what it was.  States he had an instant pain but he stood up, walked in the house and took ibuprofen and initially felt okay.  He had full range of motion of his leg initially.  However, as the day has gone on, his thigh has become increasingly swollen and he states he no longer can bend his knee or bear weight on his leg.  The pain has becomes severe.  He is not anticoagulated.  He denies temperature change in his right leg.  He denies any other injuries.            Patient History   Past Medical History:   Diagnosis Date    Hypertension      No past surgical history on file.  Family History   Problem Relation Name Age of Onset    Hypertension Father      Heart disease Maternal Grandmother      Other (abdominal aortic aneurysm) Paternal Grandfather       Social History     Tobacco Use    Smoking status: Never    Smokeless tobacco: Former     Types: Chew   Vaping Use    Vaping status: Never Used   Substance Use Topics    Alcohol use: Not Currently     Comment: RARELY    Drug use: Never       Physical Exam   ED Triage Vitals [04/03/25 2157]   Temperature Heart Rate Respirations BP   37.2 °C (99 °F) 64 18 --      Pulse Ox Temp Source Heart Rate Source Patient Position   97 % Temporal Monitor --      BP Location FiO2 (%)     Right arm --       Physical Exam  Vitals and nursing note reviewed.   Constitutional:       General: He is not in acute distress.  HENT:      Head: Atraumatic.      Mouth/Throat:      Mouth: Mucous membranes are moist.      Pharynx: Oropharynx is clear.   Eyes:      Extraocular Movements: Extraocular movements intact.      Conjunctiva/sclera: Conjunctivae normal.      Pupils: Pupils are equal,  round, and reactive to light.   Cardiovascular:      Rate and Rhythm: Normal rate and regular rhythm.      Pulses: Normal pulses.   Pulmonary:      Effort: Pulmonary effort is normal. No respiratory distress.      Breath sounds: Normal breath sounds.   Abdominal:      General: There is no distension.      Palpations: Abdomen is soft.      Tenderness: There is no abdominal tenderness. There is no guarding or rebound.   Musculoskeletal:         General: No deformity.      Cervical back: Neck supple.      Comments: Tense, wood-like right anterior/lateral thigh with exquisite tenderness to palpation.  Patient is unable to flex and extend the knee.  He is unable to lift his leg at the thigh.  He has 2+ pedal pulses and capillary refill around 2 seconds.  Sensation intact.  No pallor.   Skin:     General: Skin is warm and dry.   Neurological:      Mental Status: He is alert and oriented to person, place, and time. Mental status is at baseline.      Cranial Nerves: No cranial nerve deficit.      Sensory: No sensory deficit.      Motor: No weakness.   Psychiatric:         Mood and Affect: Mood normal.         Behavior: Behavior normal.         ED Course & MDM                  No data recorded     [place] Coma Scale Score: 15 (04/03/25 2201 : Gela Dickson RN)                           Medical Decision Making  49-year-old male presents to the emergency department for right thigh injury this morning which has progressed to severe pain, swelling and loss of movement.  On my exam, he has a tense, extremely tender, swollen right upper leg in the anterior lateral thigh.  He does not have pulselessness, pallor, paresthesias but he does have pain out of proportion.  I do have a suspicion for compartment syndrome, although he may just have a large hematoma.  CT angio ordered  X-ray of the right femur shows a foreign body which is chronic for the patient.  Patient signed out to Kirby Rush pending CT angio results and  disposition.        Procedure  Procedures     Parul Merritt PA-C  04/04/25 0045

## 2025-04-14 ENCOUNTER — OFFICE VISIT (OUTPATIENT)
Dept: ORTHOPEDIC SURGERY | Facility: CLINIC | Age: 50
End: 2025-04-14
Payer: COMMERCIAL

## 2025-04-14 DIAGNOSIS — S80.11XA CONTUSION OF RIGHT LEG, INITIAL ENCOUNTER: ICD-10-CM

## 2025-04-14 DIAGNOSIS — T14.8XXA HEMATOMA AND CONTUSION: ICD-10-CM

## 2025-04-14 PROCEDURE — 99203 OFFICE O/P NEW LOW 30 MIN: CPT | Performed by: INTERNAL MEDICINE

## 2025-04-14 PROCEDURE — 99213 OFFICE O/P EST LOW 20 MIN: CPT | Performed by: INTERNAL MEDICINE

## 2025-04-14 ASSESSMENT — PATIENT HEALTH QUESTIONNAIRE - PHQ9
2. FEELING DOWN, DEPRESSED OR HOPELESS: NOT AT ALL
1. LITTLE INTEREST OR PLEASURE IN DOING THINGS: NOT AT ALL
SUM OF ALL RESPONSES TO PHQ9 QUESTIONS 1 AND 2: 0

## 2025-04-14 NOTE — PROGRESS NOTES
"    History: Beny \"Elio\" is here for his right thigh.    Past medical history: Multiple  Medications: Multiple  Allergies: No known drug allergies    Please refer to the intake H&P regarding the patient's review of systems, family history and social history as was done today    HEENT: Normal  Lungs: Clear to auscultation  Heart: RRR  Abdomen: Soft, nontender  Skin: clear  Extremity: []   Contralateral exam is normal for strength, motion, stability and neurovascular assessment.    Radiographs: []     Assessment: []     Plan: []   All questions were answered today with the patient.    Scribe Attestation  By signing my name below, IAni Scribe   attest that this documentation has been prepared under the direction and in the presence of Oscar Floyd MD.    "

## 2025-04-14 NOTE — LETTER
April 14, 2025     Patient: Beny Roth II   YOB: 1975   Date of Visit: 4/14/2025       To Whom It May Concern:    It is my medical opinion that Beny Roth may return to work on 04/21/25 .    If you have any questions or concerns, please don't hesitate to call.         Sincerely,        Rukhsana Ventura MD    CC: No Recipients

## 2025-04-14 NOTE — PROGRESS NOTES
"  Acute Injury New Patient Visit    CC:   Chief Complaint   Patient presents with    Right Thigh - Pain    Right Lower Leg - Pain       HPI: Beny \"Maria" is a 49 y.o. male presents today for evaluation for acute right thigh injury sustained last eleven days ago after he fell while pushing a wheelbarrow. He was evaluated in the ER where x-rays and CT scans were taken. He is here for initial evaluation.  Has a past medical history of a foreign body in his right thigh from an old injury several years ago.        Review of Systems   GENERAL: Negative for malaise, significant weight loss, fever  MUSCULOSKELETAL: See HPI  NEURO:  Negative for numbness / tingling     Past Medical History  Past Medical History:   Diagnosis Date    Hypertension        Medication review  Medication Documentation Review Audit       Reviewed by Justo Patricia MD (Physician) on 02/12/25 at 1518      Medication Order Taking? Sig Documenting Provider Last Dose Status   NIFEdipine XL 60 mg 24 hr tablet [id number]  Take 1 tablet (60 mg) by mouth once daily. Justo Patricia MD  Active                    Allergies  Allergies   Allergen Reactions    Penicillins Rash       Social History  Social History     Socioeconomic History    Marital status:      Spouse name: Not on file    Number of children: Not on file    Years of education: Not on file    Highest education level: Not on file   Occupational History    Not on file   Tobacco Use    Smoking status: Never    Smokeless tobacco: Former     Types: Chew   Vaping Use    Vaping status: Never Used   Substance and Sexual Activity    Alcohol use: Not Currently     Comment: RARELY    Drug use: Never    Sexual activity: Yes     Partners: Female   Other Topics Concern    Not on file   Social History Narrative    Not on file     Social Drivers of Health     Financial Resource Strain: Not on file   Food Insecurity: Not on file   Transportation Needs: Not on file   Physical Activity: Not on file   Stress: " Not on file   Social Connections: Not on file   Intimate Partner Violence: Not on file   Housing Stability: Not on file       Surgical History  No past surgical history on file.    Physical Exam:  GENERAL:  Patient is awake, alert, and oriented to person place and time.  Patient appears well nourished and well kept.  Affect Calm, Not Acutely Distressed.  HEENT:  Normocephalic, Atraumatic, EOMI  CARDIOVASCULAR:  Hemodynamically stable.  RESPIRATORY:  Normal respirations with unlabored breathing.  Extremity: Right lower leg and thigh shows hematoma palpable on the anterior lateral aspect.  There is no clinical signs infection.  Bruising and ecchymosis is beginning to gravitate down his right leg to his calf.  Quadricep strength is 5/5 on the right, and 5/5 in the left.  No clinical findings of compartment syndrome.  Patellar and quadricep mechanism intact.  Satisfactory capillary fill time.  He can flex right hip at 90 degrees, no pain with internal or external rotation.  He can flex her right knee to 130 degrees, full extension at 0 degrees.  Distal pulses are palpable.      Diagnostics: X-rays reviewed  CT angio lower extremity right w and or wo IV contrast  Narrative: Interpreted By:  Arun Saunders,   STUDY:  CT ANGIO LOWER EXTREMITY RIGHT W AND OR WO IV CONTRAST; ;  4/4/2025  1:08 am      INDICATION:  Signs/Symptoms:right thigh swelling s/p trauma r/o active extrav.          COMPARISON:  Correlation made to right femur radiography of 04/03/2025.      ACCESSION NUMBER(S):  [id number]      ORDERING CLINICIAN:  YRN BENAVIDES      TECHNIQUE:  CT angiography of the right lower extremity was performed following  IV administration of 100 cc Omnipaque 350. Multiplanar, MIP and 3D  reformations.      FINDINGS:  There is an ill-defined region of the mixed attenuation compatible  with hematoma in the ventral right thigh musculature, the precise  margins of which are difficult to delineate, but which measures up  "to  at least 11.5 x 6.5 cm in transaxial diameters and 12.5 cm in CC  diameter. There is surrounding edema.      There is a small radiodense foreign body in the subcutaneous fat of  the ventral-medial right thigh, without surrounding edema, most  likely related to remote prior penetrating trauma.      Right lower extremity arteries appear normal. There is no abnormal  enhancement to suggest active bleeding.      Hypoenhancement of the calf arteries probably relates to technical  factors.      Mild partially calcified atherosclerosis of left common iliac artery  and abdominal aorta. No hemodynamically significant luminal narrowing  or acute abnormality or abnormal dilation of the imaged systemic  arteries.      Cholelithiasis.      Impression: There is an ill-defined region of the mixed attenuation compatible  with hematoma in the ventral right thigh musculature, the precise  margins of which are difficult to delineate, but which measures up to  at least 11.5 x 6.5 cm in transaxial diameters and 12.5 cm in CC  diameter. There is surrounding edema.      There is a small radiodense foreign body in the subcutaneous fat of  the ventral-medial right thigh, without surrounding edema, most  likely related to remote prior penetrating trauma.      Right lower extremity arteries appear normal. There is no abnormal  enhancement to suggest active bleeding.          MACRO:  None      Signed by: Arun Saunders 4/4/2025 1:32 AM  Dictation workstation:   [id number]      Procedure: None    Assessment: Right thigh contusion with hematoma    Plan: Beny \"Elio\" presents today for evaluation for acute right thigh injury sustained last eleven days ago after he fell while pushing a wheelbarrow.  CT and x-rays showed no obvious fractures, however does show a hematoma. We recommended crutches as needed, warm compresses for the hematoma.  He has an asymptomatic foreign body from an injury several years ago.  He will follow-up in 2-3 weeks for " reevaluation. He will be off work for a week.  He may call for any work modifications.  May consider physical therapy.    No orders of the defined types were placed in this encounter.     At the conclusion of the visit there were no further questions by the patient/family regarding their plan of care.  Patient was instructed to call or return with any issues, questions, or concerns regarding their injury and/or treatment plan described above.     04/14/25 at 11:13 AM - Rukhsana Ventura MD  Scribe Attestation  By signing my name below, I, Clay Katemo, Scribe   attest that this documentation has been prepared under the direction and in the presence of Rukhsana Ventura MD.    Office: [phone]    This note was prepared using voice recognition software.  The details of this note are correct and have been reviewed, and corrected to the best of my ability.  Some grammatical errors may persist related to the Dragon software.

## 2025-04-16 ENCOUNTER — APPOINTMENT (OUTPATIENT)
Dept: ORTHOPEDIC SURGERY | Facility: CLINIC | Age: 50
End: 2025-04-16
Payer: COMMERCIAL

## 2025-04-29 ENCOUNTER — APPOINTMENT (OUTPATIENT)
Dept: ORTHOPEDIC SURGERY | Facility: CLINIC | Age: 50
End: 2025-04-29
Payer: COMMERCIAL

## 2025-04-29 DIAGNOSIS — T14.8XXA HEMATOMA AND CONTUSION: ICD-10-CM

## 2025-04-29 DIAGNOSIS — S80.11XA CONTUSION OF RIGHT LEG, INITIAL ENCOUNTER: Primary | ICD-10-CM

## 2025-04-29 PROCEDURE — 1036F TOBACCO NON-USER: CPT | Performed by: INTERNAL MEDICINE

## 2025-04-29 PROCEDURE — 99213 OFFICE O/P EST LOW 20 MIN: CPT | Performed by: INTERNAL MEDICINE

## 2025-04-29 NOTE — LETTER
April 29, 2025     Patient: Beny Roth II   YOB: 1975   Date of Visit: 4/29/2025       To Whom It May Concern:    Beny Roth was seen in my clinic on 4/29/2025 at 10:15 am. Please excuse Beny for his absence from work on this day to make the appointment. Beny may return to work today with light duty restrictions of limited walking for the next four weeks.    If you have any questions or concerns, please don't hesitate to call.         Sincerely,         Rukhsana Ventura MD        CC: No Recipients

## 2025-04-29 NOTE — Clinical Note
April 29, 2025     Patient: Beny Roth II   YOB: 1975   Date of Visit: 4/29/2025       To Whom It May Concern:    It is my medical opinion that Beny Roth {Work release (duty restriction):53271}.    If you have any questions or concerns, please don't hesitate to call.         Sincerely,        Rukhsana Ventura MD    CC: No Recipients

## 2025-04-29 NOTE — PROGRESS NOTES
"      CC:   Chief Complaint   Patient presents with    Right Thigh - Follow-up     Contusion/hematoma       HPI: Beny \"Maria" is a 49 y.o. male presents today for reevaluation for right thigh contusion with hematoma. He states that he is doing better. 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  He denies any tingling or numbness.        Review of Systems   GENERAL: Negative for malaise, significant weight loss, fever  MUSCULOSKELETAL: See HPI  NEURO:  Negative for numbness / tingling     Past Medical History  Medical History[1]    Medication review  Medication Documentation Review Audit       Reviewed by Gloria Rojas MA (Medical Assistant) on 04/29/25 at 1026      Medication Order Taking? Sig Documenting Provider Last Dose Status   NIFEdipine XL 60 mg 24 hr tablet [id number]  Take 1 tablet (60 mg) by mouth once daily. Justo Patricia MD  Active                    Allergies  RX Allergies[2]    Social History  Social History     Socioeconomic History    Marital status:      Spouse name: Not on file    Number of children: Not on file    Years of education: Not on file    Highest education level: Not on file   Occupational History    Not on file   Tobacco Use    Smoking status: Never    Smokeless tobacco: Former     Types: Chew   Vaping Use    Vaping status: Never Used   Substance and Sexual Activity    Alcohol use: Not Currently     Comment: RARELY    Drug use: Never    Sexual activity: Yes     Partners: Female   Other Topics Concern    Not on file   Social History Narrative    Not on file     Social Drivers of Health     Financial Resource Strain: Not on file   Food Insecurity: Not on file   Transportation Needs: Not on file   Physical Activity: Not on file   Stress: Not on file   Social Connections: Not on file   Intimate Partner Violence: Not on file   Housing Stability: Not on file       Surgical History  Surgical History[3]    Physical Exam:  GENERAL:  Patient is awake, " alert, and oriented to person place and time.  Patient appears well nourished and well kept.  Affect Calm, Not Acutely Distressed.  HEENT:  Normocephalic, Atraumatic, EOMI  CARDIOVASCULAR:  Hemodynamically stable.  RESPIRATORY:  Normal respirations with unlabored breathing.  Extremity: Right lower leg and thigh shows hematoma palpable on the anterior lateral aspect, which has improved, with bruising and ecchymosis which has gravitated down to his lower leg and toes. There is no clinical signs infection. Bruising and ecchymosis is beginning to gravitate down his right leg to his calf and to the toes. Quadricep strength is 5/5 on the right, and 5/5 in the left. No clinical findings of compartment syndrome. Patellar and quadricep mechanism intact. Satisfactory capillary fill time. He can flex right hip at 90 degrees, no pain with internal or external rotation. He can flex her right knee to 130 degrees, full extension at 0 degrees. Distal pulses are palpable.       Diagnostics: None today  CT angio lower extremity right w and or wo IV contrast  Narrative: Interpreted By:  Arun Saunders,   STUDY:  CT ANGIO LOWER EXTREMITY RIGHT W AND OR WO IV CONTRAST; ;  4/4/2025  1:08 am      INDICATION:  Signs/Symptoms:right thigh swelling s/p trauma r/o active extrav.          COMPARISON:  Correlation made to right femur radiography of 04/03/2025.      ACCESSION NUMBER(S):  [id number]      ORDERING CLINICIAN:  YRN BENAVIDES      TECHNIQUE:  CT angiography of the right lower extremity was performed following  IV administration of 100 cc Omnipaque 350. Multiplanar, MIP and 3D  reformations.      FINDINGS:  There is an ill-defined region of the mixed attenuation compatible  with hematoma in the ventral right thigh musculature, the precise  margins of which are difficult to delineate, but which measures up to  at least 11.5 x 6.5 cm in transaxial diameters and 12.5 cm in CC  diameter. There is surrounding edema.      There is a  "small radiodense foreign body in the subcutaneous fat of  the ventral-medial right thigh, without surrounding edema, most  likely related to remote prior penetrating trauma.      Right lower extremity arteries appear normal. There is no abnormal  enhancement to suggest active bleeding.      Hypoenhancement of the calf arteries probably relates to technical  factors.      Mild partially calcified atherosclerosis of left common iliac artery  and abdominal aorta. No hemodynamically significant luminal narrowing  or acute abnormality or abnormal dilation of the imaged systemic  arteries.      Cholelithiasis.      Impression: There is an ill-defined region of the mixed attenuation compatible  with hematoma in the ventral right thigh musculature, the precise  margins of which are difficult to delineate, but which measures up to  at least 11.5 x 6.5 cm in transaxial diameters and 12.5 cm in CC  diameter. There is surrounding edema.      There is a small radiodense foreign body in the subcutaneous fat of  the ventral-medial right thigh, without surrounding edema, most  likely related to remote prior penetrating trauma.      Right lower extremity arteries appear normal. There is no abnormal  enhancement to suggest active bleeding.          MACRO:  None      Signed by: Arun Saunders 4/4/2025 1:32 AM  Dictation workstation:   [id number]        Procedure: None    Assessment: Right thigh contusion with hematoma     Plan: Beny \"Elio\" presents today for reevaluation for right thigh contusion with hematoma. He is clinically doing better. Slowly resolving hematoma. He will continue with warm compresses for the hematoma.  We also discussed the possibility of thigh-high compression.  He will follow-up as needed. He will return to work today with limited amount of walking for 4 weeks.  Will call for any work modifications.    No orders of the defined types were placed in this encounter.     At the conclusion of the visit there were no " further questions by the patient/family regarding their plan of care.  Patient was instructed to call or return with any issues, questions, or concerns regarding their injury and/or treatment plan described above.     04/29/25 at 10:30 AM - Rukhsana Ventura MD  Scribe Attestation  By signing my name below, I, Clay Charles, Scribe   attest that this documentation has been prepared under the direction and in the presence of Rukhsana Ventura MD.    Office: [phone]    This note was prepared using voice recognition software.  The details of this note are correct and have been reviewed, and corrected to the best of my ability.  Some grammatical errors may persist related to the Dragon software.         [1]   Past Medical History:  Diagnosis Date    Hypertension    [2]   Allergies  Allergen Reactions    Penicillins Rash   [3] History reviewed. No pertinent surgical history.

## 2025-08-12 ENCOUNTER — APPOINTMENT (OUTPATIENT)
Dept: PRIMARY CARE | Facility: CLINIC | Age: 50
End: 2025-08-12
Payer: COMMERCIAL

## 2025-09-23 ENCOUNTER — APPOINTMENT (OUTPATIENT)
Dept: PRIMARY CARE | Facility: CLINIC | Age: 50
End: 2025-09-23
Payer: COMMERCIAL